# Patient Record
Sex: FEMALE | Race: WHITE | NOT HISPANIC OR LATINO | Employment: OTHER | URBAN - NONMETROPOLITAN AREA
[De-identification: names, ages, dates, MRNs, and addresses within clinical notes are randomized per-mention and may not be internally consistent; named-entity substitution may affect disease eponyms.]

---

## 2019-01-09 ENCOUNTER — LAB REQUISITION (OUTPATIENT)
Dept: LAB | Facility: HOSPITAL | Age: 74
End: 2019-01-09

## 2019-01-09 DIAGNOSIS — Z00.00 ENCOUNTER FOR GENERAL ADULT MEDICAL EXAMINATION WITHOUT ABNORMAL FINDINGS: ICD-10-CM

## 2019-01-09 LAB
FERRITIN SERPL-MCNC: 688 NG/ML (ref 17.9–464)
IRON 24H UR-MRATE: 89 MCG/DL (ref 42–180)
IRON SATN MFR SERPL: 31 % (ref 20–45)
TIBC SERPL-MCNC: 286 MCG/DL (ref 225–420)

## 2019-01-09 PROCEDURE — 82728 ASSAY OF FERRITIN: CPT | Performed by: INTERNAL MEDICINE

## 2019-01-09 PROCEDURE — 83540 ASSAY OF IRON: CPT | Performed by: INTERNAL MEDICINE

## 2019-01-09 PROCEDURE — 83550 IRON BINDING TEST: CPT | Performed by: INTERNAL MEDICINE

## 2019-01-22 ENCOUNTER — LAB REQUISITION (OUTPATIENT)
Dept: LAB | Facility: HOSPITAL | Age: 74
End: 2019-01-22

## 2019-01-22 DIAGNOSIS — Z00.00 ENCOUNTER FOR GENERAL ADULT MEDICAL EXAMINATION WITHOUT ABNORMAL FINDINGS: ICD-10-CM

## 2019-01-22 LAB
ALBUMIN SERPL-MCNC: 3.8 G/DL (ref 3.5–5)
ALBUMIN/GLOB SERPL: 1.1 G/DL (ref 1.1–2.5)
ALP SERPL-CCNC: 70 U/L (ref 24–120)
ALT SERPL W P-5'-P-CCNC: 23 U/L (ref 0–54)
ANION GAP SERPL CALCULATED.3IONS-SCNC: 11 MMOL/L (ref 4–13)
AST SERPL-CCNC: 24 U/L (ref 7–45)
BILIRUB SERPL-MCNC: 0.4 MG/DL (ref 0.1–1)
BUN BLD-MCNC: 22 MG/DL (ref 5–21)
BUN/CREAT SERPL: 17.7 (ref 7–25)
CALCIUM SPEC-SCNC: 9.5 MG/DL (ref 8.4–10.4)
CHLORIDE SERPL-SCNC: 98 MMOL/L (ref 98–110)
CO2 SERPL-SCNC: 29 MMOL/L (ref 24–31)
CREAT BLD-MCNC: 1.24 MG/DL (ref 0.5–1.4)
FERRITIN SERPL-MCNC: 541 NG/ML (ref 11.1–264)
FOLATE SERPL-MCNC: >20 NG/ML
GFR SERPL CREATININE-BSD FRML MDRD: 43 ML/MIN/1.73
GLOBULIN UR ELPH-MCNC: 3.5 GM/DL
GLUCOSE BLD-MCNC: 105 MG/DL (ref 70–100)
IRON 24H UR-MRATE: 94 MCG/DL (ref 42–180)
IRON SATN MFR SERPL: 35 % (ref 20–45)
POTASSIUM BLD-SCNC: 4.3 MMOL/L (ref 3.5–5.3)
PROT SERPL-MCNC: 7.3 G/DL (ref 6.3–8.7)
SODIUM BLD-SCNC: 138 MMOL/L (ref 135–145)
TIBC SERPL-MCNC: 269 MCG/DL (ref 225–420)
VIT B12 BLD-MCNC: >1000 PG/ML (ref 239–931)

## 2019-01-22 PROCEDURE — 82746 ASSAY OF FOLIC ACID SERUM: CPT | Performed by: INTERNAL MEDICINE

## 2019-01-22 PROCEDURE — 83550 IRON BINDING TEST: CPT | Performed by: INTERNAL MEDICINE

## 2019-01-22 PROCEDURE — 82728 ASSAY OF FERRITIN: CPT | Performed by: INTERNAL MEDICINE

## 2019-01-22 PROCEDURE — 80053 COMPREHEN METABOLIC PANEL: CPT | Performed by: INTERNAL MEDICINE

## 2019-01-22 PROCEDURE — 82607 VITAMIN B-12: CPT | Performed by: INTERNAL MEDICINE

## 2019-01-22 PROCEDURE — 83540 ASSAY OF IRON: CPT | Performed by: INTERNAL MEDICINE

## 2019-04-23 ENCOUNTER — LAB REQUISITION (OUTPATIENT)
Dept: LAB | Facility: HOSPITAL | Age: 74
End: 2019-04-23

## 2019-04-23 DIAGNOSIS — Z00.00 ENCOUNTER FOR GENERAL ADULT MEDICAL EXAMINATION WITHOUT ABNORMAL FINDINGS: ICD-10-CM

## 2019-04-23 LAB
ALBUMIN SERPL-MCNC: 3.6 G/DL (ref 3.5–5)
ALBUMIN/GLOB SERPL: 1.1 G/DL (ref 1.1–2.5)
ALP SERPL-CCNC: 70 U/L (ref 24–120)
ALT SERPL W P-5'-P-CCNC: <15 U/L (ref 0–54)
ANION GAP SERPL CALCULATED.3IONS-SCNC: 12 MMOL/L (ref 4–13)
AST SERPL-CCNC: 17 U/L (ref 7–45)
BILIRUB SERPL-MCNC: 0.4 MG/DL (ref 0.1–1)
BUN BLD-MCNC: 17 MG/DL (ref 5–21)
BUN/CREAT SERPL: 15.5 (ref 7–25)
CALCIUM SPEC-SCNC: 9.3 MG/DL (ref 8.4–10.4)
CHLORIDE SERPL-SCNC: 100 MMOL/L (ref 98–110)
CO2 SERPL-SCNC: 24 MMOL/L (ref 24–31)
CREAT BLD-MCNC: 1.1 MG/DL (ref 0.5–1.4)
FERRITIN SERPL-MCNC: 400 NG/ML (ref 11.1–264)
FOLATE SERPL-MCNC: 11.3 NG/ML (ref 4.78–24.2)
GFR SERPL CREATININE-BSD FRML MDRD: 49 ML/MIN/1.73
GLOBULIN UR ELPH-MCNC: 3.3 GM/DL
GLUCOSE BLD-MCNC: 116 MG/DL (ref 70–100)
IRON 24H UR-MRATE: 77 MCG/DL (ref 42–180)
IRON SATN MFR SERPL: 27 % (ref 20–45)
POTASSIUM BLD-SCNC: 4.5 MMOL/L (ref 3.5–5.3)
PROT SERPL-MCNC: 6.9 G/DL (ref 6.3–8.7)
SODIUM BLD-SCNC: 136 MMOL/L (ref 135–145)
TIBC SERPL-MCNC: 290 MCG/DL (ref 225–420)
VIT B12 BLD-MCNC: 492 PG/ML (ref 239–931)

## 2019-04-23 PROCEDURE — 80053 COMPREHEN METABOLIC PANEL: CPT | Performed by: INTERNAL MEDICINE

## 2019-04-23 PROCEDURE — 82728 ASSAY OF FERRITIN: CPT | Performed by: INTERNAL MEDICINE

## 2019-04-23 PROCEDURE — 82746 ASSAY OF FOLIC ACID SERUM: CPT | Performed by: INTERNAL MEDICINE

## 2019-04-23 PROCEDURE — 82607 VITAMIN B-12: CPT | Performed by: INTERNAL MEDICINE

## 2019-04-23 PROCEDURE — 83550 IRON BINDING TEST: CPT | Performed by: INTERNAL MEDICINE

## 2019-04-23 PROCEDURE — 83540 ASSAY OF IRON: CPT | Performed by: INTERNAL MEDICINE

## 2019-06-20 ENCOUNTER — TELEPHONE (OUTPATIENT)
Dept: UROLOGY | Age: 74
End: 2019-06-20

## 2019-06-20 NOTE — TELEPHONE ENCOUNTER
Attempted to call PT. She did not answer and no VM.  If she calls back, schedule her with TERRA DX: hematuria (records in media)

## 2019-07-29 ENCOUNTER — LAB REQUISITION (OUTPATIENT)
Dept: LAB | Facility: HOSPITAL | Age: 74
End: 2019-07-29

## 2019-07-29 DIAGNOSIS — Z00.00 ENCOUNTER FOR GENERAL ADULT MEDICAL EXAMINATION WITHOUT ABNORMAL FINDINGS: ICD-10-CM

## 2019-07-29 LAB
ALBUMIN SERPL-MCNC: 3.9 G/DL (ref 3.5–5)
ALBUMIN/GLOB SERPL: 1.1 G/DL (ref 1.1–2.5)
ALP SERPL-CCNC: 78 U/L (ref 24–120)
ALT SERPL W P-5'-P-CCNC: 17 U/L (ref 0–54)
ANION GAP SERPL CALCULATED.3IONS-SCNC: 11 MMOL/L (ref 4–13)
AST SERPL-CCNC: 18 U/L (ref 7–45)
BILIRUB SERPL-MCNC: 0.7 MG/DL (ref 0.1–1)
BUN BLD-MCNC: 21 MG/DL (ref 5–21)
BUN/CREAT SERPL: 19.3 (ref 7–25)
CALCIUM SPEC-SCNC: 9.6 MG/DL (ref 8.4–10.4)
CHLORIDE SERPL-SCNC: 99 MMOL/L (ref 98–110)
CO2 SERPL-SCNC: 27 MMOL/L (ref 24–31)
CREAT BLD-MCNC: 1.09 MG/DL (ref 0.5–1.4)
FERRITIN SERPL-MCNC: 433 NG/ML (ref 11.1–264)
FOLATE SERPL-MCNC: 7.5 NG/ML (ref 4.78–24.2)
GFR SERPL CREATININE-BSD FRML MDRD: 49 ML/MIN/1.73
GLOBULIN UR ELPH-MCNC: 3.4 GM/DL
GLUCOSE BLD-MCNC: 136 MG/DL (ref 70–100)
IRON 24H UR-MRATE: 96 MCG/DL (ref 42–180)
IRON SATN MFR SERPL: 33 % (ref 20–45)
POTASSIUM BLD-SCNC: 4 MMOL/L (ref 3.5–5.3)
PROT SERPL-MCNC: 7.3 G/DL (ref 6.3–8.7)
SODIUM BLD-SCNC: 137 MMOL/L (ref 135–145)
TIBC SERPL-MCNC: 290 MCG/DL (ref 225–420)
VIT B12 BLD-MCNC: 342 PG/ML (ref 239–931)

## 2019-07-29 PROCEDURE — 82607 VITAMIN B-12: CPT | Performed by: INTERNAL MEDICINE

## 2019-07-29 PROCEDURE — 82746 ASSAY OF FOLIC ACID SERUM: CPT | Performed by: INTERNAL MEDICINE

## 2019-07-29 PROCEDURE — 82728 ASSAY OF FERRITIN: CPT | Performed by: INTERNAL MEDICINE

## 2019-07-29 PROCEDURE — 83540 ASSAY OF IRON: CPT | Performed by: INTERNAL MEDICINE

## 2019-07-29 PROCEDURE — 80053 COMPREHEN METABOLIC PANEL: CPT | Performed by: INTERNAL MEDICINE

## 2019-07-29 PROCEDURE — 83550 IRON BINDING TEST: CPT | Performed by: INTERNAL MEDICINE

## 2019-08-18 PROBLEM — E61.1 IRON DEFICIENCY: Status: ACTIVE | Noted: 2019-08-18

## 2019-08-18 PROBLEM — K44.9 HIATAL HERNIA: Status: ACTIVE | Noted: 2019-08-18

## 2019-08-18 PROBLEM — I10 HYPERTENSION: Status: ACTIVE | Noted: 2019-08-18

## 2019-08-18 PROBLEM — N18.30 CKD (CHRONIC KIDNEY DISEASE), STAGE III (HCC): Status: ACTIVE | Noted: 2019-08-18

## 2019-08-18 PROBLEM — E53.8 B12 DEFICIENCY: Status: ACTIVE | Noted: 2019-08-18

## 2019-08-18 PROBLEM — K29.50 CHRONIC GASTRITIS: Status: ACTIVE | Noted: 2019-08-18

## 2019-08-18 PROBLEM — D64.9 NORMOCYTIC ANEMIA: Status: ACTIVE | Noted: 2019-08-18

## 2019-08-19 NOTE — PROGRESS NOTES
[facility] ONC [facility] ONCOLOGY  [address] [place] [address] [address]  [facility] [address]  [phone]    Patient Name: Shireen Greer  Encounter Date: 08/20/2019  YOB: 1945  Patient Number: [id number]          REASON FOR VISIT: Shireen Greer is a 73-year-old female who returns in follow-up of anemia with iron and B12 deficiencies.  She has been on ferrous sulfate since 05/2017 before it was stopped on 10/16/2017 when she received an initial infusion of INFeD 500 mg and was started on B12 monthly injections beginning 10/04/2017. It has been nearly 9 months since last receipt of IV Venofer infusion last 11/14/2018.  She is here with her daughter, Torie. History is obtained from the patient who is considered reliable.      DIAGNOSTIC ABNORMALITIES:    1.Colonoscopy, 06/24/2016.  Diverticular disease. Small rectal internal hemorrhoids. Dr. Villar.  2.EGD, 06/24/2017. Irregular Z-line at EG junction. Biopsies taken to rule out Ortiz's metaplasia. 6 mm long sliding hiatal hernia. Mild gastritis, biopsy taken. Small bowel biopsies taken to rule out celiac disease. Capsule endoscopy recommended as outpatient.  3.Labs 04/24/2017, Dr. Barron: Hemoglobin 9.9, hematocrit 31.1, MCV 81 (81 - 99), MCH 25.8 (27 - 31), MCHC 31.8 (33 - 37), RDW 14.8 (12.3 - 15.1), platelets 272,000, WBC 7.9, normal differential. CMP: Sodium 135 (depressed), glucose 132, BUN 51, creatinine 1.54 (each elevated), EGFR 35.39 (depressed), calcium 8.8, total protein 7.5, total bilirubin 0.3, AST 17, ALT 24, alkaline phos 73 (each normal). Serum iron 31 (50 - 175), TIBC 349 (250 - 450), B12 of 865, ferritin 40.  4.Labs, 06/16/2017: Hemoglobin 10. 5, hematocrit 33.5, MCV 82.4, MCH 25.9, MCHC 31.3 (each depressed), platelets 230,000, WBC 8.0, glucose 127, BUN 40, creatinine 1.4 (each elevated), GFR 39.5 (low),  (normal) iron 49, Fe sat 12.3%, ferritin 43, B12 of 236 (each depressed), folate 7.1, SIMBA negative,  "SPIEP normal. JULIAN:  No monoclonal immunoglobulins, TSH 3.5, T4 of 9.3 (each normal),  5.Stools FOB, negative x3, 06/27/2017.     PREVIOUS INTERVENTIONS:    1.Ferrous sulfate 325 mg p.o. twice daily since 05/2017 (per patient) through 10/16/2017.  2.B12, 1000 mcg monthly beginning 10/04/2017 and 11/06/2017.  3.INFeD 500 mg on 10/16/2017; 12/13/2017; 02/06/2018; 04/11/2018.  4.Venofer 200 mg IV, 11/12, 11/13, 11/14/2018 (600 mg)    ADULT ILLNESSES:    •Microcytic anemia ( ICD-10:D50.9 ;Iron deficiency anemia, unspecified  •Anemia ( ICD-10:D64.9 ;Anemia, unspecified  •Anxiety ( ICD-10:F41.9 ;Anxiety disorder, unspecified  •Arthritis (disorder) ( ICD-10:M19.90 ;Unspecified osteoarthritis, unspecified site  •Chronic kidney disease ( Stage III. GFR 35.39 mL/minute, 04/24/2017; ICD-10:N18.3 ;Chronic kidney disease, stage 3 (moderate) )  •Depression ( ICD-10:F32.9 ;Major depressive disorder, single episode, unspecified  •Diverticulosis ( history of; ICD-10:K57.90 ;Diverticulosis of intestine, part unspecified, without perforation or abscess without bleeding )  •Drug intolerance ( oral iron; ICD-10:T45.4x5D ;Adverse effect of iron and its compounds, subsequent encounter )  •Gastritis (disorder) ( ICD-10:K29.70 ;Gastritis, unspecified, without bleeding  •Hypertension ( ICD-10:I10 ;Essential (primary) hypertension  •Internal hemorrhoids ( ICD-10:K64.8 ;Other hemorrhoids  •Iron deficiency anemia (disorder) ( ICD-10:D50.9 ;Iron deficiency anemia, unspecified  •Malabsorption ( ICD-10:K90.9 ;Intestinal malabsorption, unspecified  •Urinary tract infection ( recurrent; ICD-10:N39.0 ;Urinary tract infection, site not specified )  •Vitamin B12 deficiency ( ICD-10:E53.8 ;Deficiency of other specified B group vitamins    SURGERIES:    •Bilateral cataracts, 11/2019 and 12/2019  •Hernia repair, 2011  •Partial colectomy with hernia repair  •Cystoscopy, 01/30/2018, Dr. Aguilera (urology). Recurrent urinary tract infections. \"All looked " "good\"  •Total abdominal hysterectomy with bilateral salpingo-oophorectomy, 2011  •EGD, 2016. \"Hiatal hernia\"  •EGD, 06/24/2017. Irregular Z-line at EG junction. Biopsies taken to rule out Ortiz's metaplasia. A 6 mm long sliding hiatal hernia. Mild gastritis, biopsy taken. Small bowel biopsies taken to rule out celiac disease. Capsule endoscopy recommended as outpatient  •Colonoscopy, 06/24/2016. Diverticular disease. Small rectal internal hemorrhoids. Dr. Villar      Problem List Items Addressed This Visit     None         No history exists.       PAST MEDICAL HISTORY:  ALLERGIES:  No Known Allergies  CURRENT MEDICATIONS:  Outpatient Encounter Medications as of 8/20/2019   Medication Sig Dispense Refill   • calcitriol (ROCALTROL) 0.25 MCG capsule calcitriol 0.25 mcg capsule     • Cholecalciferol (D3-1000) 1000 units tablet D3-2000 2,000 unit capsule   TAKE ONE CAPSULE BY MOUTH ONCE DAILY.     • Ergocalciferol 2000 units tablet Take 2,000 Int'l Units by mouth Daily.     • furosemide (LASIX) 20 MG tablet Take 20 mg by mouth Daily.     • losartan (COZAAR) 50 MG tablet losartan 50 mg tablet   TAKE ONE TABLET BY MOUTH EVERY DAY     • metoprolol succinate XL (TOPROL-XL) 50 MG 24 hr tablet metoprolol succinate ER 50 mg tablet,extended release 24 hr     • omeprazole (priLOSEC) 20 MG capsule      • oxybutynin (DITROPAN) 5 MG tablet Take 5 mg by mouth.     • pantoprazole (PROTONIX) 20 MG EC tablet pantoprazole 20 mg tablet,delayed release     • triamterene-hydrochlorothiazide (DYAZIDE) 37.5-25 MG per capsule triamterene 37.5 mg-hydrochlorothiazide 25 mg capsule   Take 1 capsule every day by oral route for 30 days.     • [DISCONTINUED] olmesartan (BENICAR) 40 MG tablet Take 40 mg by mouth Daily.       No facility-administered encounter medications on file as of 8/20/2019.      ADULT ILLNESSES:  Patient Active Problem List   Diagnosis Code   • Normocytic anemia D64.9   • Iron deficiency E61.1   • CKD (chronic kidney disease), " "stage III (CMS/HCC) N18.3   • B12 deficiency E53.8   • Chronic gastritis K29.50   • Hiatal hernia K44.9   • Hypertension I10     SURGERIES:  Past Surgical History:   Procedure Laterality Date   • CATARACT EXTRACTION, BILATERAL  11/2019, 12/2019   • COLECTOMY PARTIAL / TOTAL      Partial colectomy with hernia repair   • COLONOSCOPY  06/24/2016    Diverticular disease. Small rectal internal hemorrhoids. Dr. Villar   • CYSTOSCOPY  01/30/2018    Dr. Aguilera (urology). Recurrent urinary tract infections. \"All looked good\"   • ENDOSCOPY  2016    \"Hiatal hernia\"   • ENDOSCOPY  06/24/2017     Irregular Z-line at EG junction. Biopsies taken to rule out Ortiz's metaplasia. A 6 mm long sliding hiatal hernia. Mild gastritis, biopsy taken. Small bowel biopsies taken to rule out celiac disease. Capsule endoscopy recommended as outpatient   • HERNIA REPAIR  2011   • TOTAL ABDOMINAL HYSTERECTOMY WITH SALPINGO OOPHORECTOMY  2011     HEALTH MAINTENANCE ITEMS:  Health Maintenance Due   Topic Date Due   • TDAP/TD VACCINES (1 - Tdap) 09/08/1965   • ZOSTER VACCINE (1 of 2) 09/08/1996   • PNEUMOCOCCAL VACCINES (65+ LOW/MEDIUM RISK) (1 of 2 - PCV13) 09/08/2011   • HEPATITIS C SCREENING  08/12/2019   • MEDICARE ANNUAL WELLNESS  08/12/2019   • INFLUENZA VACCINE  08/01/2019       <no information>  Last Completed Colonoscopy       Status Date      COLONOSCOPY Done 6/24/2016  COLONOSCOPY  Colonoscopy           Patient has more history with this topic...          There is no immunization history on file for this patient.  Last Completed Mammogram     Patient has no health maintenance due at this time            FAMILY HISTORY:  History reviewed. No pertinent family history.  SOCIAL HISTORY:  Social History     Socioeconomic History   • Marital status: Single     Spouse name: Not on file   • Number of children: Not on file   • Years of education: Not on file   • Highest education level: Not on file   Tobacco Use   • Smoking status: Former " "Smoker   • Smokeless tobacco: Never Used       CHANGES IN PAST MEDICAL/SOCIAL/FAMILY HISTORY:   Since the last visit, Shireen Greer states that she has no changes    PRIMARY COMPLAINT:  See ROS.  \"About the same.\"    REVIEW OF SYSTEMS:  Constitutional:  The patient's appetite is fairly good. Her energy is chronically low.  \"Lately seems better.\" She manages her personal ADLs and some house chores. \"Depends, with some days better than others.\" Says she is driven around by her children.  Her son lives with her and the others live nearby.  She has lost 4 pounds (had gained 3 pounds at her prior visit).  Reminds me that she needs to lose 50 pounds before she is considered for surgery to fix an abdominal hernia.  \"Still doesn't bother me so I don't worry about it.\"  She has no fevers, chills, or drenching night sweats. Sleep habits seem appropriate.  Ear/Nose/Throat/Mouth:  She reports improving nasal congestion. No ear pains, sore throat, nosebleeds, or sore tongue. She has no headaches. She denies any hoarseness, change in voice quality, or hemoptysis.    Ocular:  She reports no eye pain, significant change in visual acuity, double vision, or blurry vision.  Improved since cataract surgeries  Respiratory:  She reports no chronic cough, significant shortness of breathing, phlegm production, or unexplained chest wall pain.  Cardiovascular:  She reports no exertional chest pain, chest pressure, or chest heaviness. She reports no claudication. She reports no palpitations or symptomatic orthostasis.  Gastrointestinal:  She reports no pica, no dysphagia, nausea, vomiting, postprandial abdominal pain, bloating, cramping, or change in bowel habits. No dark (off oral iron) discoloration of the stool. She reports no rectal bleeding, constipation, or diarrhea.  Last EGD and colonoscopy in 2016. \"Hiatal hernia.\" Oral iron intolerant - constipation.  Genitourinary:  She reports recurrent UTIs, saying she has not needed antibiotics in " "the interval.  She currently has no urinary burning, frequency, dribbling, or discoloration. She reports difficulty controlling her bladder (cough/sneeze incontinence). Wears adult pads.  She has no need to urinate frequently through the night.  Was seen by Dr. Williamson (urology) for cystoscopy, 01/30/2018.  \"All looked good.\"  Musculoskeletal:  She reports no unexplained arthralgias, myalgias, or nighttime leg cramping.  Extremities:  She reports no trouble with fluid retention or significant leg swelling.  Endocrine:  She reports no problems with excess thirst, excessive urination, vasomotor instability, or unexplained fatigue.  Heme/Lymphatic:  She reports no unexplained bleeding, bruising, petechial rashes, or swollen glands.  Skin:  She reports no itching, rashes, or lesions which won't heal.  Neuro:  She uses a walker for support due to gait unsteadiness but reports no loss of consciousness, seizures, fainting spells, or dizziness. She reports no weakness of face, arms, or legs. She has no difficulty with speech. She has no tremors.  She has no paresthesias.  Psych:  She seems generally satisfied with life. She denies depression or anxiety at this time, \"I'm okay.\" She reports no mood swings.      VITAL SIGNS: Pulse 72   Temp 97.1 °F (36.2 °C)   Resp 16   Ht 162.6 cm (64\")   Wt 116 kg (256 lb)   SpO2 94%   Breastfeeding? No   BMI 43.94 kg/m²  Body surface area is 2.17 meters squared.   Pain Score    08/20/19 1047   PainSc: 0-No pain     PHYSICAL EXAMINATION:  General:  She is a pleasant, obese, overly well-developed, well-nourished, and modestly-kept elderly female who is comfortable at rest. She arrived in the exam room ambulatory with a walker. She appears to be her stated age. Her skin color is a bit pale.    Head/Neck:  The patient is anicteric and atraumatic. The oropharynx, mouth, and throat are clear. The trachea is midline. The neck is supple without evidence of jugular venous distention or " cervical adenopathy.   Eyes:  The pupils are equal, round, and reactive to light. The extraocular movements are full. There is no scleral jaundice or erythema.    Chest:  The respiratory efforts are normal and unhindered. The chest is clear to auscultation and percussion. There are no wheezes, rhonchi, rales, or asymmetry of breath sounds.  Cardiovascular:  The patient has a regular cardiac rate and rhythm without murmurs, rubs, or gallops. The peripheral pulses are equal and full.  Abdomen:  The belly is soft and morbidly obese with a firm, nontender ventral hernia. Her habitus precludes an adequate exam. There is no rebound or guarding. There is no overt organomegaly, mass-effect, or tenderness. Bowel sounds are active and of normal character.  Extremities:  There is no evidence of cyanosis, clubbing, with 1+ bipedal edema.  Rheumatologic:  There is no overt evidence of rheumatoid deformities of the hands. There is no sausaging of the fingers. There is no sign of active synovitis. The gait is slow and walker supported.  Cutaneous:  There are no overt rashes, disseminated lesions, purpura, or petechiae.    Lymphatics:  There is no evidence of adenopathy in the cervical, supraclavicular, or axillary areas.  Neurologic:  The patient is alert, oriented, cooperative, and pleasant. She is appropriately conversant. She ambulated into the exam room without assistance but declined transfer from chair to exam table. There is no overt dysfunction of the motor, sensory, cerebellar systems.  Psych:  Mood and affect are appropriate for circumstance. Eye contact is appropriate. Normal judgement and decision making.      LABS        Lab Results - Last 18 Months   Lab Units 07/29/19  1700 04/23/19  1700 01/22/19  1600   GLUCOSE mg/dL 136* 116* 105*   SODIUM mmol/L 137 136 138   POTASSIUM mmol/L 4.0 4.5 4.3   CO2 mmol/L 27.0 24.0 29.0   CHLORIDE mmol/L 99 100 98   ANION GAP mmol/L 11.0 12.0 11.0   CREATININE mg/dL 1.09 1.10 1.24  "  BUN mg/dL 21 17 22*   BUN / CREAT RATIO  19.3 15.5 17.7   CALCIUM mg/dL 9.6 9.3 9.5   EGFR IF NONAFRICN AM mL/min/1.73 49* 49* 43*   ALK PHOS U/L 78 70 70   TOTAL PROTEIN g/dL 7.3 6.9 7.3   ALT (SGPT) U/L 17 <15 23   AST (SGOT) U/L 18 17 24   BILIRUBIN mg/dL 0.7 0.4 0.4   ALBUMIN g/dL 3.90 3.60 3.80   GLOBULIN gm/dL 3.4 3.3 3.5       No results for input(s): MSPIKE, KAPPALAMB, IGLFLC, URICACID, FREEKAPPAL, CEA, LDH, REFLABREPO in the last 95940 hours.    Lab Results - Last 18 Months   Lab Units 07/29/19  1700 04/23/19  1700 01/22/19  1600 01/09/19  1600   IRON mcg/dL 96 77 94 89   TIBC mcg/dL 290 290 269 286   IRON SATURATION % 33 27 35 31   FERRITIN ng/mL 433.00* 400.00* 541.00* 688.00*   FOLATE ng/mL 7.50 11.30 >20.00  --                ASSESSMENT:  1.    Normocytic (borderline microcytic) anemia.  Stable, Hgb 11.1; MCV 90.2 on 07/29/2019 (prior range:  Hgb 9.7 - 12.5; MCV 81 - 96.6).  Contributions from iron deficiency, iron underutilization (anemia of chronic disease), and chronic kidney disease.    2.    Iron deficiency.  Resolved since INFeD (Fe sat greater than 20%).  3.    History of chronic gastritis and hiatal hernia.  4.    Chronic kidney disease, Stage III. Baseline GFR 35.39 on 04/24/2017.  Stable, GFR 49 mL/min, 07/29/2019 (prior range:  31.6 - 52 mL/min).  5.    History of diverticulosis.  6.    Internal hemorrhoids.  7.    Arthritis.  8.    Hypertension.  9.    Oral iron failure and intolerance (constipation).  10.  Low B12.  On monthly injections.  11.  Recurrent urinary tract infections (UTIs).  None in the interval. Was seen by Dr. Williamson (urology) for cystoscopy, 01/30/2018.  \"All looked good.\"        RECOMMENDATIONS:  1.     Re:  Apprise of labs from 07/29/2019 with stable (but persistent) anemia otherwise normal CBC, repleted B12, repleted ferritin, repleted folate (Folbic called in on 11/02/2018 - stopped, 02/05/2019), repleted serum iron, repleted (greater than 20%) Fe sat " (contribution from anemia of iron underutilization/anemia of chronic disease), normoglycemia, low (stable) GFR with otherwise essentially normal CMP.  2.    Stay off Folbic  3.    Previously noted the labs from 06/16/2017 including the normal SPIEP, low GFR but otherwise stable CMP, negative SIMBA, low B12, normal folate, low serum Fe, normal TIBC, low ferritin, low transferrin saturation, normal LDH/T4 and TSH.  Also noted the negative stools for fecal occult blood (FOB) x3.  4.    Previously reviewed the EGD and colonoscopy reports from 06/2016.  5.    The rationale and potential toxicities (including the risk for increased mortality from stroke, myocardial infarction (MI), congestive heart failure (CHF), seizures, sepsis, allergic reactions) for ULISES support previously discussed. Questions answered.  She will agree to proceed with a trial of therapy if and when it becomes indicated.   6.    The rationale and potential toxicities (anaphylaxis included) of IV iron previously discussed. Questions answered. She will agree to a trial of therapy if and when it is deemed indicated.   7.    CBC w diff every 4 weeks and Procrit 40,000 units subcutaneously if Hgb less than 10 and hematocrit less than 30 initially, then less than 11 and less than 33 [place] [facility] [place]  8.    Advance Directive discussed and available for review - initially discussed on 02/05/2019.  9.  Return to the [place] office in 12 weeks with pre-office (fasting) CMP, B12, folate, serum iron, Fe sat, ferritin, CBC with differential.    MEDICAL DECISION MAKING: Moderate Complexity  AMOUNT OF DATA: Moderate    TIME SPENT:  Face-to-face time on this encounter, as defined by the American Medical Association in the 2019 Current Procedural Terminology codebook; assessment, record review, lab review, planning and education - 30 minutes (greater than 50% face-to-face).    Vignesh Holman MD  [facility]    cc:  MD Nadja Burgos APRN

## 2019-08-20 ENCOUNTER — OFFICE VISIT (OUTPATIENT)
Dept: ONCOLOGY | Facility: CLINIC | Age: 74
End: 2019-08-20

## 2019-08-20 VITALS
OXYGEN SATURATION: 94 % | RESPIRATION RATE: 16 BRPM | HEART RATE: 72 BPM | TEMPERATURE: 97.1 F | WEIGHT: 256 LBS | HEIGHT: 64 IN | BODY MASS INDEX: 43.71 KG/M2

## 2019-08-20 DIAGNOSIS — C91.11 CHRONIC LYMPHOCYTIC LEUKEMIA OF B-CELL TYPE IN REMISSION (HCC): Primary | ICD-10-CM

## 2019-08-20 PROCEDURE — 99214 OFFICE O/P EST MOD 30 MIN: CPT | Performed by: INTERNAL MEDICINE

## 2019-08-20 RX ORDER — OMEPRAZOLE 20 MG/1
CAPSULE, DELAYED RELEASE ORAL
COMMUNITY
Start: 2016-08-29 | End: 2020-01-21

## 2019-08-20 RX ORDER — FUROSEMIDE 20 MG/1
20 TABLET ORAL DAILY
COMMUNITY
Start: 2016-09-15

## 2019-08-20 RX ORDER — LOSARTAN POTASSIUM 50 MG/1
TABLET ORAL
COMMUNITY
End: 2020-12-07 | Stop reason: ALTCHOICE

## 2019-08-20 RX ORDER — OLMESARTAN MEDOXOMIL 40 MG/1
40 TABLET ORAL DAILY
COMMUNITY
Start: 2016-09-15 | End: 2019-08-20

## 2019-08-20 RX ORDER — PANTOPRAZOLE SODIUM 20 MG/1
TABLET, DELAYED RELEASE ORAL
COMMUNITY
End: 2020-01-21 | Stop reason: DRUGHIGH

## 2019-08-20 RX ORDER — CALCITRIOL 0.25 UG/1
0.25 CAPSULE, LIQUID FILLED ORAL DAILY
COMMUNITY

## 2019-08-20 RX ORDER — TRIAMTERENE AND HYDROCHLOROTHIAZIDE 37.5; 25 MG/1; MG/1
CAPSULE ORAL
COMMUNITY
End: 2020-12-07 | Stop reason: ALTCHOICE

## 2019-08-20 RX ORDER — METOPROLOL SUCCINATE 50 MG/1
TABLET, EXTENDED RELEASE ORAL
Status: ON HOLD | COMMUNITY
End: 2023-01-21

## 2019-08-20 RX ORDER — OXYBUTYNIN CHLORIDE 5 MG/1
5 TABLET ORAL
COMMUNITY
Start: 2016-08-29 | End: 2020-01-21 | Stop reason: DRUGHIGH

## 2019-09-18 DIAGNOSIS — C91.11 CHRONIC LYMPHOCYTIC LEUKEMIA OF B-CELL TYPE IN REMISSION (HCC): Primary | ICD-10-CM

## 2019-11-04 ENCOUNTER — LAB (OUTPATIENT)
Dept: ONCOLOGY | Facility: CLINIC | Age: 74
End: 2019-11-04

## 2019-11-04 ENCOUNTER — CLINICAL SUPPORT (OUTPATIENT)
Dept: ONCOLOGY | Facility: CLINIC | Age: 74
End: 2019-11-04

## 2019-11-04 VITALS
HEART RATE: 72 BPM | DIASTOLIC BLOOD PRESSURE: 78 MMHG | OXYGEN SATURATION: 97 % | RESPIRATION RATE: 18 BRPM | SYSTOLIC BLOOD PRESSURE: 118 MMHG | TEMPERATURE: 97.8 F

## 2019-11-04 DIAGNOSIS — C91.11 CHRONIC LYMPHOCYTIC LEUKEMIA OF B-CELL TYPE IN REMISSION (HCC): ICD-10-CM

## 2019-11-04 DIAGNOSIS — N18.30 CKD (CHRONIC KIDNEY DISEASE), STAGE III (HCC): ICD-10-CM

## 2019-11-04 DIAGNOSIS — N18.30 ANEMIA IN STAGE 3 CHRONIC KIDNEY DISEASE (HCC): ICD-10-CM

## 2019-11-04 DIAGNOSIS — D63.1 ANEMIA IN STAGE 3 CHRONIC KIDNEY DISEASE (HCC): ICD-10-CM

## 2019-11-04 LAB
ALBUMIN SERPL-MCNC: 3.8 G/DL (ref 3.5–5.2)
ALBUMIN/GLOB SERPL: 1 G/DL
ALP SERPL-CCNC: 69 U/L (ref 39–117)
ALT SERPL W P-5'-P-CCNC: 13 U/L (ref 1–33)
ANION GAP SERPL CALCULATED.3IONS-SCNC: 16 MMOL/L (ref 5–15)
AST SERPL-CCNC: 18 U/L (ref 1–32)
BASOPHILS # BLD AUTO: 0.01 10*3/MM3 (ref 0–0.2)
BASOPHILS NFR BLD AUTO: 0.1 % (ref 0–1.5)
BILIRUB SERPL-MCNC: 0.5 MG/DL (ref 0.2–1.2)
BUN BLD-MCNC: 19 MG/DL (ref 8–23)
BUN/CREAT SERPL: 14.2 (ref 7–25)
CALCIUM SPEC-SCNC: 9.5 MG/DL (ref 8.6–10.5)
CHLORIDE SERPL-SCNC: 96 MMOL/L (ref 98–107)
CO2 SERPL-SCNC: 25 MMOL/L (ref 22–29)
CREAT BLD-MCNC: 1.34 MG/DL (ref 0.57–1)
DEPRECATED RDW RBC AUTO: 42.6 FL (ref 37–54)
EOSINOPHIL # BLD AUTO: 0.32 10*3/MM3 (ref 0–0.4)
EOSINOPHIL NFR BLD AUTO: 3.3 % (ref 0.3–6.2)
ERYTHROCYTE [DISTWIDTH] IN BLOOD BY AUTOMATED COUNT: 12.9 % (ref 12.3–15.4)
FERRITIN SERPL-MCNC: 536.6 NG/ML (ref 13–150)
GFR SERPL CREATININE-BSD FRML MDRD: 39 ML/MIN/1.73
GLOBULIN UR ELPH-MCNC: 4 GM/DL
GLUCOSE BLD-MCNC: 137 MG/DL (ref 65–99)
HCT VFR BLD AUTO: 35 % (ref 34–46.6)
HGB BLD-MCNC: 11.1 G/DL (ref 12–15.9)
IMM GRANULOCYTES # BLD AUTO: 0.02 10*3/MM3 (ref 0–0.05)
IMM GRANULOCYTES NFR BLD AUTO: 0.2 % (ref 0–0.5)
IRON 24H UR-MRATE: 56 MCG/DL (ref 37–145)
IRON SATN MFR SERPL: 15 % (ref 20–50)
LYMPHOCYTES # BLD AUTO: 2.37 10*3/MM3 (ref 0.7–3.1)
LYMPHOCYTES NFR BLD AUTO: 24.3 % (ref 19.6–45.3)
MCH RBC QN AUTO: 28.4 PG (ref 26.6–33)
MCHC RBC AUTO-ENTMCNC: 31.7 G/DL (ref 31.5–35.7)
MCV RBC AUTO: 89.5 FL (ref 79–97)
MONOCYTES # BLD AUTO: 0.79 10*3/MM3 (ref 0.1–0.9)
MONOCYTES NFR BLD AUTO: 8.1 % (ref 5–12)
NEUTROPHILS # BLD AUTO: 6.25 10*3/MM3 (ref 1.7–7)
NEUTROPHILS NFR BLD AUTO: 64 % (ref 42.7–76)
PLATELET # BLD AUTO: 224 10*3/MM3 (ref 140–450)
PMV BLD AUTO: 10.9 FL (ref 6–12)
POTASSIUM BLD-SCNC: 4 MMOL/L (ref 3.5–5.2)
PROT SERPL-MCNC: 7.8 G/DL (ref 6–8.5)
RBC # BLD AUTO: 3.91 10*6/MM3 (ref 3.77–5.28)
SODIUM BLD-SCNC: 137 MMOL/L (ref 136–145)
TIBC SERPL-MCNC: 373 MCG/DL (ref 298–536)
TRANSFERRIN SERPL-MCNC: 250 MG/DL (ref 200–360)
WBC NRBC COR # BLD: 9.76 10*3/MM3 (ref 3.4–10.8)

## 2019-11-04 PROCEDURE — 84466 ASSAY OF TRANSFERRIN: CPT | Performed by: INTERNAL MEDICINE

## 2019-11-04 PROCEDURE — 82746 ASSAY OF FOLIC ACID SERUM: CPT | Performed by: INTERNAL MEDICINE

## 2019-11-04 PROCEDURE — 99211 OFF/OP EST MAY X REQ PHY/QHP: CPT | Performed by: INTERNAL MEDICINE

## 2019-11-04 PROCEDURE — 80053 COMPREHEN METABOLIC PANEL: CPT | Performed by: INTERNAL MEDICINE

## 2019-11-04 PROCEDURE — 83540 ASSAY OF IRON: CPT | Performed by: INTERNAL MEDICINE

## 2019-11-04 PROCEDURE — 85025 COMPLETE CBC W/AUTO DIFF WBC: CPT | Performed by: INTERNAL MEDICINE

## 2019-11-04 PROCEDURE — 82607 VITAMIN B-12: CPT | Performed by: INTERNAL MEDICINE

## 2019-11-04 PROCEDURE — 82728 ASSAY OF FERRITIN: CPT | Performed by: INTERNAL MEDICINE

## 2019-11-04 NOTE — PROGRESS NOTES
Patient here for lab evaluation for a Procrit injection secondary to stage III chronic kidney disease.  States that she is feeling good today.  Gets a little tired with activity but states that this is nothing new.  Skin w/d to touch.  Color wnl.  Eating and drinking well. Parameters set to start Procrit for Hgb <10 and Hct <30. Patient has never required a Procrit injection in the past.  Has had to have several doses of IV iron with saturation stabilizing and Hgb and Hct stating >11 and >33. Reviewed labs with patient and daughter-Hgb 11.1 and Hct 35.  Patient has an appointment next week with MILLICENT Corley and she and her daughter wants to discuss changing frequency of lab work so that she does not have to come as frequently.  Instructed to call with any problems.  Patient v/u.

## 2019-11-05 LAB
FOLATE SERPL-MCNC: 12.4 NG/ML (ref 4.78–24.2)
VIT B12 BLD-MCNC: 440 PG/ML (ref 211–946)

## 2019-11-07 ENCOUNTER — TELEPHONE (OUTPATIENT)
Dept: ONCOLOGY | Facility: CLINIC | Age: 74
End: 2019-11-07

## 2019-11-07 PROBLEM — K90.9 IRON MALABSORPTION: Status: ACTIVE | Noted: 2019-11-07

## 2019-11-07 RX ORDER — ACETAMINOPHEN 325 MG/1
650 TABLET ORAL ONCE
Status: CANCELLED | OUTPATIENT
Start: 2019-12-02

## 2019-11-07 RX ORDER — SODIUM CHLORIDE 9 MG/ML
250 INJECTION, SOLUTION INTRAVENOUS ONCE
Status: CANCELLED | OUTPATIENT
Start: 2019-12-09

## 2019-11-07 RX ORDER — DIPHENHYDRAMINE HYDROCHLORIDE 50 MG/ML
50 INJECTION INTRAMUSCULAR; INTRAVENOUS AS NEEDED
Status: CANCELLED | OUTPATIENT
Start: 2019-12-02

## 2019-11-07 RX ORDER — DIPHENHYDRAMINE HYDROCHLORIDE 50 MG/ML
50 INJECTION INTRAMUSCULAR; INTRAVENOUS AS NEEDED
Status: CANCELLED | OUTPATIENT
Start: 2019-12-09

## 2019-11-07 RX ORDER — FAMOTIDINE 10 MG/ML
20 INJECTION, SOLUTION INTRAVENOUS AS NEEDED
Status: CANCELLED | OUTPATIENT
Start: 2019-12-09

## 2019-11-07 RX ORDER — SODIUM CHLORIDE 9 MG/ML
250 INJECTION, SOLUTION INTRAVENOUS ONCE
Status: CANCELLED | OUTPATIENT
Start: 2019-12-02

## 2019-11-07 RX ORDER — ACETAMINOPHEN 325 MG/1
650 TABLET ORAL ONCE
Status: CANCELLED | OUTPATIENT
Start: 2019-12-09

## 2019-11-07 RX ORDER — FAMOTIDINE 10 MG/ML
20 INJECTION, SOLUTION INTRAVENOUS AS NEEDED
Status: CANCELLED | OUTPATIENT
Start: 2019-12-02

## 2019-11-07 NOTE — TELEPHONE ENCOUNTER
Called and instructed patient on low iron saturation and that she is needing to get IV iron.  Patient stated that she cannot tolerate oral iron.  Instructed that she will get an iron infusion once a week for two weeks.  This has been set up at [facility] [place] [facility] for 11/12 and 11/19 at 1200. Patient v/u and is agreeable to plan.

## 2019-11-25 NOTE — TELEPHONE ENCOUNTER
Patient's son passed away on11/11/19 so the pt was not able to go [place] [facility] [place] I have r/s her appt with Ali for 12/5/19. If she needs to have the iron r/s prior to that please call her.

## 2019-11-26 NOTE — TELEPHONE ENCOUNTER
Called patient and iron infusions have been rescheduled to be done on 12/02 and 12/09 at [facility] [place].  Instructed patient that she does not need to keep appointment on 12/05, that we will reschedule it and call her with new appointment date and time.  Patient v/u.

## 2019-11-27 ENCOUNTER — TELEPHONE (OUTPATIENT)
Dept: ONCOLOGY | Facility: CLINIC | Age: 74
End: 2019-11-27

## 2019-11-27 NOTE — TELEPHONE ENCOUNTER
Attempted to contact pt per Anish junior 12/5/19 appts. She needs to be seen about a month after her iron infusion on 12/19/19. Left v/m for her to call back.

## 2020-01-16 ENCOUNTER — LAB (OUTPATIENT)
Dept: ONCOLOGY | Facility: CLINIC | Age: 75
End: 2020-01-16

## 2020-01-16 ENCOUNTER — CLINICAL SUPPORT (OUTPATIENT)
Dept: ONCOLOGY | Facility: CLINIC | Age: 75
End: 2020-01-16

## 2020-01-16 VITALS
TEMPERATURE: 97.8 F | DIASTOLIC BLOOD PRESSURE: 88 MMHG | HEART RATE: 60 BPM | OXYGEN SATURATION: 96 % | SYSTOLIC BLOOD PRESSURE: 128 MMHG | RESPIRATION RATE: 16 BRPM

## 2020-01-16 DIAGNOSIS — C91.11 CHRONIC LYMPHOCYTIC LEUKEMIA OF B-CELL TYPE IN REMISSION (HCC): ICD-10-CM

## 2020-01-16 DIAGNOSIS — D63.1 ANEMIA IN STAGE 3 CHRONIC KIDNEY DISEASE (HCC): ICD-10-CM

## 2020-01-16 DIAGNOSIS — N18.30 CKD (CHRONIC KIDNEY DISEASE), STAGE III (HCC): ICD-10-CM

## 2020-01-16 DIAGNOSIS — N18.30 ANEMIA IN STAGE 3 CHRONIC KIDNEY DISEASE (HCC): ICD-10-CM

## 2020-01-16 LAB
ALBUMIN SERPL-MCNC: 3.6 G/DL (ref 3.5–5.2)
ALBUMIN/GLOB SERPL: 0.9 G/DL
ALP SERPL-CCNC: 78 U/L (ref 39–117)
ALT SERPL W P-5'-P-CCNC: 10 U/L (ref 1–33)
ANION GAP SERPL CALCULATED.3IONS-SCNC: 10 MMOL/L (ref 5–15)
AST SERPL-CCNC: 15 U/L (ref 1–32)
BASOPHILS # BLD AUTO: 0.01 10*3/MM3 (ref 0–0.2)
BASOPHILS NFR BLD AUTO: 0.1 % (ref 0–1.5)
BILIRUB SERPL-MCNC: 0.3 MG/DL (ref 0.2–1.2)
BUN BLD-MCNC: 20 MG/DL (ref 8–23)
BUN/CREAT SERPL: 16.3 (ref 7–25)
CALCIUM SPEC-SCNC: 9.5 MG/DL (ref 8.6–10.5)
CHLORIDE SERPL-SCNC: 97 MMOL/L (ref 98–107)
CO2 SERPL-SCNC: 28 MMOL/L (ref 22–29)
CREAT BLD-MCNC: 1.23 MG/DL (ref 0.57–1)
DEPRECATED RDW RBC AUTO: 44.4 FL (ref 37–54)
EOSINOPHIL # BLD AUTO: 0.18 10*3/MM3 (ref 0–0.4)
EOSINOPHIL NFR BLD AUTO: 2.2 % (ref 0.3–6.2)
ERYTHROCYTE [DISTWIDTH] IN BLOOD BY AUTOMATED COUNT: 13.2 % (ref 12.3–15.4)
FERRITIN SERPL-MCNC: 477.8 NG/ML (ref 13–150)
GFR SERPL CREATININE-BSD FRML MDRD: 43 ML/MIN/1.73
GLOBULIN UR ELPH-MCNC: 4 GM/DL
GLUCOSE BLD-MCNC: 208 MG/DL (ref 65–99)
HCT VFR BLD AUTO: 33.9 % (ref 34–46.6)
HGB BLD-MCNC: 10.5 G/DL (ref 12–15.9)
IMM GRANULOCYTES # BLD AUTO: 0.01 10*3/MM3 (ref 0–0.05)
IMM GRANULOCYTES NFR BLD AUTO: 0.1 % (ref 0–0.5)
IRON 24H UR-MRATE: 47 MCG/DL (ref 37–145)
IRON SATN MFR SERPL: 15 % (ref 20–50)
LYMPHOCYTES # BLD AUTO: 1.32 10*3/MM3 (ref 0.7–3.1)
LYMPHOCYTES NFR BLD AUTO: 16.4 % (ref 19.6–45.3)
MCH RBC QN AUTO: 27.7 PG (ref 26.6–33)
MCHC RBC AUTO-ENTMCNC: 31 G/DL (ref 31.5–35.7)
MCV RBC AUTO: 89.4 FL (ref 79–97)
MONOCYTES # BLD AUTO: 0.65 10*3/MM3 (ref 0.1–0.9)
MONOCYTES NFR BLD AUTO: 8.1 % (ref 5–12)
NEUTROPHILS # BLD AUTO: 5.87 10*3/MM3 (ref 1.7–7)
NEUTROPHILS NFR BLD AUTO: 73.1 % (ref 42.7–76)
PLATELET # BLD AUTO: 236 10*3/MM3 (ref 140–450)
PMV BLD AUTO: 10.1 FL (ref 6–12)
POTASSIUM BLD-SCNC: 3.7 MMOL/L (ref 3.5–5.2)
PROT SERPL-MCNC: 7.6 G/DL (ref 6–8.5)
RBC # BLD AUTO: 3.79 10*6/MM3 (ref 3.77–5.28)
SODIUM BLD-SCNC: 135 MMOL/L (ref 136–145)
TIBC SERPL-MCNC: 322 MCG/DL (ref 298–536)
TRANSFERRIN SERPL-MCNC: 216 MG/DL (ref 200–360)
WBC NRBC COR # BLD: 8.04 10*3/MM3 (ref 3.4–10.8)

## 2020-01-16 PROCEDURE — 80053 COMPREHEN METABOLIC PANEL: CPT | Performed by: INTERNAL MEDICINE

## 2020-01-16 PROCEDURE — 82728 ASSAY OF FERRITIN: CPT | Performed by: INTERNAL MEDICINE

## 2020-01-16 PROCEDURE — 99211 OFF/OP EST MAY X REQ PHY/QHP: CPT | Performed by: NURSE PRACTITIONER

## 2020-01-16 PROCEDURE — 85025 COMPLETE CBC W/AUTO DIFF WBC: CPT | Performed by: INTERNAL MEDICINE

## 2020-01-16 PROCEDURE — 83540 ASSAY OF IRON: CPT | Performed by: INTERNAL MEDICINE

## 2020-01-16 PROCEDURE — 84466 ASSAY OF TRANSFERRIN: CPT | Performed by: INTERNAL MEDICINE

## 2020-01-16 NOTE — PROGRESS NOTES
Patient here for lab evaluation to initiate Retacrit for stage III chronic kidney disease.  States that she is feeling ok today.  Gets tired with activity.  Skin w/d to touch.  Color wnl. Eating and drinking well.  Parameters set for Retacrit 40,000 units for Hgb <10 and Hct <30.  Reviewed labs with patient and daughter, Hgb 10.5 and Hct 33.9-patient does not meet guidelines to initiate injections.  Patient has an appointment with Dr Parkinson 01/21/2020-will discuss with him about appointment frequency.  Daughter would like it to be change to every 6 or 8 weeks.  Instructed to call with any problems.  Patient and daughter v/u.

## 2020-01-18 NOTE — PROGRESS NOTES
[facility] ONC [facility] ONCOLOGY  [address] [place] [address] [address]  [facility] [address]  [phone]    Patient Name: Shireen Greer  Encounter Date: 01/21/2020  YOB: 1945  Patient Number: [id number]      REASON FOR VISIT: Shireen Greer is a 74-year-old female who returns in follow-up of anemia with iron and B12 deficiencies.  She has been on ferrous sulfate since 05/2017 before it was stopped on 10/16/2017 when she received an initial infusion of INFeD 500 mg and was started on B12 monthly injections beginning 10/04/2017. It has been over 14 months since last receipt of IV Venofer infusion last 11/14/2018.  She is here with her daughter, Torie. History is obtained from the patient who is considered reliable.      DIAGNOSTIC ABNORMALITIES:  1.Colonoscopy, 06/24/2016.  Diverticular disease. Small rectal internal hemorrhoids. Dr. Villar.  2.EGD, 06/24/2017. Irregular Z-line at EG junction. Biopsies taken to rule out Ortiz's metaplasia. 6 mm long sliding hiatal hernia. Mild gastritis, biopsy taken. Small bowel biopsies taken to rule out celiac disease. Capsule endoscopy recommended as outpatient.  3.Labs 04/24/2017, Dr. Barron: Hemoglobin 9.9, hematocrit 31.1, MCV 81 (81 - 99), MCH 25.8 (27 - 31), MCHC 31.8 (33 - 37), RDW 14.8 (12.3 - 15.1), platelets 272,000, WBC 7.9, normal differential. CMP: Sodium 135 (depressed), glucose 132, BUN 51, creatinine 1.54 (each elevated), EGFR 35.39 (depressed), calcium 8.8, total protein 7.5, total bilirubin 0.3, AST 17, ALT 24, alkaline phos 73 (each normal). Serum iron 31 (50 - 175), TIBC 349 (250 - 450), B12 of 865, ferritin 40.  4.Labs, 06/16/2017: Hemoglobin 10. 5, hematocrit 33.5, MCV 82.4, MCH 25.9, MCHC 31.3 (each depressed), platelets 230,000, WBC 8.0, glucose 127, BUN 40, creatinine 1.4 (each elevated), GFR 39.5 (low),  (normal) iron 49, Fe sat 12.3%, ferritin 43, B12 of 236 (each depressed), folate 7.1, SIMBA negative, SPIEP  "normal. JULIAN:  No monoclonal immunoglobulins, TSH 3.5, T4 of 9.3 (each normal),  5.Stools FOB, negative x3, 06/27/2017.     PREVIOUS INTERVENTIONS:    1.Ferrous sulfate 325 mg p.o. twice daily since 05/2017 (per patient) through 10/16/2017.  2.B12, 1000 mcg monthly beginning 10/04/2017 and 11/06/2017.  3.INFeD 500 mg on 10/16/2017; 12/13/2017; 02/06/2018; 04/11/2018.  4.Venofer 200 mg IV, 11/12, 11/13, 11/14/2018 (600 mg)    ADULT ILLNESSES:    •Microcytic anemia ( ICD-10:D50.9 ;Iron deficiency anemia, unspecified  •Anemia ( ICD-10:D64.9 ;Anemia, unspecified  •Anxiety ( ICD-10:F41.9 ;Anxiety disorder, unspecified  •Arthritis (disorder) ( ICD-10:M19.90 ;Unspecified osteoarthritis, unspecified site  •Chronic kidney disease ( Stage III. GFR 35.39 mL/minute, 04/24/2017; ICD-10:N18.3 ;Chronic kidney disease, stage 3 (moderate) )  •Depression ( ICD-10:F32.9 ;Major depressive disorder, single episode, unspecified  •Diverticulosis ( history of; ICD-10:K57.90 ;Diverticulosis of intestine, part unspecified, without perforation or abscess without bleeding )  •Drug intolerance ( oral iron; ICD-10:T45.4x5D ;Adverse effect of iron and its compounds, subsequent encounter )  •Gastritis (disorder) ( ICD-10:K29.70 ;Gastritis, unspecified, without bleeding  •Hypertension ( ICD-10:I10 ;Essential (primary) hypertension  •Internal hemorrhoids ( ICD-10:K64.8 ;Other hemorrhoids  •Iron deficiency anemia (disorder) ( ICD-10:D50.9 ;Iron deficiency anemia, unspecified  •Malabsorption ( ICD-10:K90.9 ;Intestinal malabsorption, unspecified  •Urinary tract infection ( recurrent; ICD-10:N39.0 ;Urinary tract infection, site not specified )  •Vitamin B12 deficiency ( ICD-10:E53.8 ;Deficiency of other specified B group vitamins    SURGERIES:    •Bilateral cataracts, 11/2019 and 12/2019  •Hernia repair, 2011  •Partial colectomy with hernia repair  •Cystoscopy, 01/30/2018, Dr. Aguilera (urology). Recurrent urinary tract infections. \"All looked " "good\"  •Total abdominal hysterectomy with bilateral salpingo-oophorectomy, 2011  •EGD, 2016. \"Hiatal hernia\"  •EGD, 06/24/2017. Irregular Z-line at EG junction. Biopsies taken to rule out Ortiz's metaplasia. A 6 mm long sliding hiatal hernia. Mild gastritis, biopsy taken. Small bowel biopsies taken to rule out celiac disease. Capsule endoscopy recommended as outpatient  •Colonoscopy, 06/24/2016. Diverticular disease. Small rectal internal hemorrhoids. Dr. Villar      Problem List Items Addressed This Visit        Genitourinary    CKD (chronic kidney disease), stage III (CMS/HCC) - Primary    Anemia in stage 3 chronic kidney disease (CMS/HCC)       Hematopoietic and Hemostatic    Normocytic anemia    Overview     DIAGNOSTIC ABNORMALITIES:  Colonoscopy, 06/24/2016.  Diverticular disease. Small rectal internal hemorrhoids. Dr. Villar.  EGD, 06/24/2017. Irregular Z-line at EG junction. Biopsies taken to rule out Ortiz's metaplasia. 6 mm long sliding hiatal hernia. Mild gastritis, biopsy taken. Small bowel biopsies taken to rule out celiac disease. Capsule endoscopy recommended as outpatient.  Labs 04/24/2017, Dr. Barron: Hemoglobin 9.9, hematocrit 31.1, MCV 81 (81 - 99), MCH 25.8 (27 - 31), MCHC 31.8 (33 - 37), RDW 14.8 (12.3 - 15.1), platelets 272,000, WBC 7.9, normal differential. CMP: Sodium 135 (depressed), glucose 132, BUN 51, creatinine 1.54 (each elevated), EGFR 35.39 (depressed), calcium 8.8, total protein 7.5, total bilirubin 0.3, AST 17, ALT 24, alkaline phos 73 (each normal). Serum iron 31 (50 - 175), TIBC 349 (250 - 450), B12 of 865, ferritin 40.\[id number], 06/16/2017: Hemoglobin 10. 5, hematocrit 33.5, MCV 82.4, MCH 25.9, MCHC 31.3 (each depressed), platelets 230,000, WBC 8.0, glucose 127, BUN 40, creatinine 1.4 (each elevated), GFR 39.5 (low),  (normal) iron 49, Fe sat 12.3%, ferritin 43, B12 of 236 (each depressed), folate 7.1, SIMBA negative, SPIEP normal. JULIAN:  No monoclonal immunoglobulins, TSH " 3.5, T4 of 9.3 (each normal),Stools FOB, negative x3, 06/27/2017.    PREVIOUS INTERVENTIONS:  Ferrous sulfate 325 mg p.o. twice daily since 05/2017 (per patient) through 10/16/2017.    B12, 1000 mcg monthly beginning 10/04/2017 and 11/06/2017.    INFeD 500 mg on 10/16/2017; 12/13/2017; 02/06/2018; 04/11/2018.    Venofer 200 mg IV, 11/12, 11/13, 11/14/2018 (600 mg)              No history exists.       PAST MEDICAL HISTORY:  ALLERGIES:  No Known Allergies  CURRENT MEDICATIONS:  Outpatient Encounter Medications as of 1/21/2020   Medication Sig Dispense Refill   • calcitriol (ROCALTROL) 0.25 MCG capsule calcitriol 0.25 mcg capsule     • Cholecalciferol (D3-1000) 1000 units tablet D3-2000 2,000 unit capsule   TAKE ONE CAPSULE BY MOUTH ONCE DAILY.     • Ergocalciferol 2000 units tablet Take 2,000 Int'l Units by mouth Daily.     • furosemide (LASIX) 20 MG tablet Take 20 mg by mouth Daily.     • losartan (COZAAR) 50 MG tablet losartan 50 mg tablet   TAKE ONE TABLET BY MOUTH EVERY DAY     • metoprolol succinate XL (TOPROL-XL) 50 MG 24 hr tablet metoprolol succinate ER 50 mg tablet,extended release 24 hr     • oxybutynin XL (DITROPAN-XL) 10 MG 24 hr tablet      • pantoprazole (PROTONIX) 40 MG EC tablet      • triamterene-hydrochlorothiazide (DYAZIDE) 37.5-25 MG per capsule triamterene 37.5 mg-hydrochlorothiazide 25 mg capsule   Take 1 capsule every day by oral route for 30 days.     • [DISCONTINUED] omeprazole (priLOSEC) 20 MG capsule      • [DISCONTINUED] oxybutynin (DITROPAN) 5 MG tablet Take 5 mg by mouth.     • [DISCONTINUED] pantoprazole (PROTONIX) 20 MG EC tablet pantoprazole 20 mg tablet,delayed release       No facility-administered encounter medications on file as of 1/21/2020.      ADULT ILLNESSES:  Patient Active Problem List   Diagnosis Code   • Normocytic anemia D64.9   • Iron deficiency E61.1   • CKD (chronic kidney disease), stage III (CMS/HCC) N18.3   • B12 deficiency E53.8   • Chronic gastritis K29.50   •  "Hiatal hernia K44.9   • Hypertension I10   • Anemia in stage 3 chronic kidney disease (CMS/[facility]) N18.3, D63.1   • Iron malabsorption K90.9   • Adiposity E66.9   • Calculus of gallbladder K80.20   • Chronic obstructive pulmonary disease (CMS/[facility]) J44.9   • Diabetes mellitus (CMS/[facility]) E11.9   • Gastroesophageal reflux disease K21.9   • Incisional hernia K43.2   • Left inguinal hernia K40.90     SURGERIES:  Past Surgical History:   Procedure Laterality Date   • CATARACT EXTRACTION, BILATERAL  11/2019, 12/2019   • COLECTOMY PARTIAL / TOTAL      Partial colectomy with hernia repair   • COLONOSCOPY  06/24/2016    Diverticular disease. Small rectal internal hemorrhoids. Dr. Villar   • CYSTOSCOPY  01/30/2018    Dr. Aguilera (urology). Recurrent urinary tract infections. \"All looked good\"   • ENDOSCOPY  2016    \"Hiatal hernia\"   • ENDOSCOPY  06/24/2017     Irregular Z-line at EG junction. Biopsies taken to rule out Ortiz's metaplasia. A 6 mm long sliding hiatal hernia. Mild gastritis, biopsy taken. Small bowel biopsies taken to rule out celiac disease. Capsule endoscopy recommended as outpatient   • HERNIA REPAIR  2011   • TOTAL ABDOMINAL HYSTERECTOMY WITH SALPINGO OOPHORECTOMY  2011     HEALTH MAINTENANCE ITEMS:  Health Maintenance Due   Topic Date Due   • MAMMOGRAM  1945   • URINE MICROALBUMIN  1945   • TDAP/TD VACCINES (1 - Tdap) 09/08/1957   • ZOSTER VACCINE (1 of 2) 09/08/1996   • PNEUMOCOCCAL VACCINE (65+ HIGH RISK) (2 of 2 - PPSV23) 11/29/2017   • INFLUENZA VACCINE  08/01/2019   • HEPATITIS C SCREENING  08/12/2019   • MEDICARE ANNUAL WELLNESS  08/12/2019   • DIABETIC FOOT EXAM  08/12/2019   • HEMOGLOBIN A1C  08/12/2019   • DIABETIC EYE EXAM  08/12/2019       <no information>  Last Completed Colonoscopy       Status Date      COLONOSCOPY Done 6/24/2016 HM COLONOSCOPY HM Colonoscopy           Patient has more history with this topic...          There is no immunization history on file for this patient.  Last " "Completed Mammogram       Status Date      MAMMOGRAM No completions recorded            FAMILY HISTORY:  History reviewed. No pertinent family history.  SOCIAL HISTORY:  Social History     Socioeconomic History   • Marital status: Single     Spouse name: Not on file   • Number of children: Not on file   • Years of education: Not on file   • Highest education level: Not on file   Tobacco Use   • Smoking status: Former Smoker   • Smokeless tobacco: Never Used       REVIEW OF SYSTEMS:  Constitutional:  The patient's appetite is fairly good. Her energy is chronically low.  \"Some days better than others.\" She manages her personal ADLs and some house chores. Says she is driven around by her children.  Her son lives with her and the others live nearby.  She has regained 3 pounds (had lost 4 pounds at her prior visit).  Reminds me that she needs to lose 50 pounds before she is considered for surgery to fix an abdominal hernia.  Asymptomatic. \" I don't worry about it.\"  She has no fevers, chills, or drenching night sweats. Sleep habits seem appropriate.  Ear/Nose/Throat/Mouth:  She reports improving nasal congestion. No ear pains, sore throat, nosebleeds, or sore tongue. She has no headaches. She denies any hoarseness, change in voice quality, or hemoptysis.    Ocular:  She reports no eye pain, significant change in visual acuity, double vision, or blurry vision.  Improved since cataract surgeries  Respiratory:  She reports no chronic cough, significant shortness of breathing, phlegm production, or unexplained chest wall pain.  Cardiovascular:  She reports no exertional chest pain, chest pressure, or chest heaviness. She reports no claudication. She reports no palpitations or symptomatic orthostasis.  Gastrointestinal:  She reports no pica, no dysphagia, nausea, vomiting, postprandial abdominal pain, bloating, cramping, or change in bowel habits. No dark (off oral iron) discoloration of the stool. She reports no rectal " "bleeding, constipation, or diarrhea.  Last EGD and colonoscopy in 2016. \"Hiatal hernia.\" Oral iron intolerant - constipation.  Genitourinary:  She reports a recurrent UTIs, saying she needed antibiotics in the interval.  She currently has no urinary burning, frequency, dribbling, or discoloration. She reports difficulty controlling her bladder (cough/sneeze incontinence). Wears adult pads.  She has no need to urinate frequently through the night.  Was seen by Dr. Williamson (urology) for cystoscopy, 01/30/2018.  \"All looked good.\"  Musculoskeletal:  She reports no unexplained arthralgias, myalgias, or nighttime leg cramping.  Extremities:  She reports no trouble with fluid retention or significant leg swelling.  Endocrine:  She reports no problems with excess thirst, excessive urination, vasomotor instability, or unexplained fatigue.  Heme/Lymphatic:  She reports no unexplained bleeding, bruising, petechial rashes, or swollen glands.  Skin:  She reports no itching, rashes, or lesions which won't heal.  Neuro:  She uses a walker for support due to gait unsteadiness but reports no loss of consciousness, seizures, fainting spells, or dizziness. She reports no weakness of face, arms, or legs. She has no difficulty with speech. She has no tremors.  She has no paresthesias.  Psych:  She seems generally satisfied with life. She denies depression or anxiety at this time, \"I'm okay.\" She reports no mood swings.      VITAL SIGNS: /78   Pulse 83   Temp 98.1 °F (36.7 °C)   Resp 16   Ht 162.6 cm (64\")   Wt 117 kg (259 lb)   SpO2 96%   Breastfeeding No   BMI 44.46 kg/m²  Body surface area is 2.18 meters squared.   Pain Score    01/21/20 1029   PainSc: 0-No pain       PHYSICAL EXAMINATION:  General:  She is a pleasant, obese, overly well-developed, well-nourished, and modestly-kept elderly female who is comfortable at rest. She arrived in the exam room ambulatory with a walker. She appears to be her stated age. Her " skin color is a bit pale.    Head/Neck:  The patient is anicteric and atraumatic. The oropharynx, mouth, and throat are clear. The trachea is midline. The neck is supple without evidence of jugular venous distention or cervical adenopathy.   Eyes:  The pupils are equal, round, and reactive to light. The extraocular movements are full. There is no scleral jaundice or erythema.    Chest:  The respiratory efforts are normal and unhindered. The chest is clear to auscultation and percussion. There are no wheezes, rhonchi, rales, or asymmetry of breath sounds.  Cardiovascular:  The patient has a regular cardiac rate and rhythm without murmurs, rubs, or gallops. The peripheral pulses are equal and full.  Abdomen:  The belly is soft and morbidly obese with a firm, nontender ventral hernia. Her habitus precludes an adequate exam. There is no rebound or guarding. There is no overt organomegaly, mass-effect, or tenderness. Bowel sounds are active and of normal character.  Extremities:  There is no evidence of cyanosis, clubbing, with 1+ bipedal edema.  Rheumatologic:  There is no overt evidence of rheumatoid deformities of the hands. There is no sausaging of the fingers. There is no sign of active synovitis. The gait is slow and walker supported.  Cutaneous:  There are no overt rashes, disseminated lesions, purpura, or petechiae.    Lymphatics:  There is no evidence of adenopathy in the cervical, supraclavicular, or axillary areas.  Neurologic:  The patient is alert, oriented, cooperative, and pleasant. She is appropriately conversant. She ambulated into the exam room without assistance but declined transfer from chair to exam table. There is no overt dysfunction of the motor, sensory, cerebellar systems.  Psych:  Mood and affect are appropriate for circumstance. Eye contact is appropriate. Normal judgement and decision making.      LABS      Lab Results - Last 18 Months   Lab Units 01/16/20  1014 11/04/19  0946 07/29/19  1700  "04/23/19  1700 01/22/19  1600 01/09/19  1600   IRON mcg/dL 47 56 96 77 94 89   TIBC mcg/dL 322 373 290 290 269 286   IRON SATURATION % 15* 15* 33 27 35 31   FERRITIN ng/mL 477.80* 536.60* 433.00* 400.00* 541.00* 688.00*   FOLATE ng/mL  --  12.40 7.50 11.30 >20.00  --      ASSESSMENT:  1.    Normocytic (borderline microcytic) anemia.  Stable, Hgb 10.5; MCV  89.4, 01/16/2020 (prior range:  Hgb 9.7 - 12.5; MCV 81 - 96.6).  Contributions from iron deficiency, iron underutilization (anemia of chronic disease), and chronic kidney disease.    2.    Iron deficiency.  Have dwindled some since INFeD (Fe sat < than 20%).  3.    History of chronic gastritis and hiatal hernia.  4.    Chronic kidney disease, Stage III. Baseline GFR 35.39 on 04/24/2017.  Stable, GFR 43 mL/min, 01/16/2020 (prior range:  31.6 - 52 mL/min).  5.    History of diverticulosis.  6.    Internal hemorrhoids.  7.    Arthritis.  8.    Hypertension.  9.    Oral iron failure and intolerance (constipation).  10.  Low B12.  On monthly injections.  11.  Recurrent urinary tract infections (UTIs).  None in the interval. Was seen by Dr. Williamson (urology) for cystoscopy, 01/30/2018.  \"All looked good.\"      RECOMMENDATIONS:  1.     Re:  Apprise of labs from 01/16/2020 with stable (but persistent) anemia otherwise normal CBC, repleted serum iron, repleted (477) ferritin, low (less than 20%) Fe sat (contribution from anemia of iron underutilization/anemia of chronic disease), hyperglycemia, low (stable) GFR with otherwise essentially normal CMP.  2.    Previously noted the labs from 06/16/2017 including the normal SPIEP, low GFR but otherwise stable CMP, negative SIMBA, low B12, normal folate, low serum Fe, normal TIBC, low ferritin, low transferrin saturation, normal LDH/T4 and TSH.  Also noted the negative stools for fecal occult blood (FOB) x3.  3.     Schedule Injectafer 750 mg IV x1 at [facility]  4.    The rationale and potential toxicities (including the risk " for increased mortality from stroke, myocardial infarction (MI), congestive heart failure (CHF), seizures, sepsis, allergic reactions) for ULISES support previously discussed. Questions answered.  She will agree to proceed with a trial of therapy if and when it becomes indicated.   5.    The rationale and potential toxicities (anaphylaxis included) of IV iron previously discussed. Questions answered. She will agree to a trial of therapy if and when it is deemed indicated.   6.    CBC w diff every 4 weeks and Procrit 40,000 units subcutaneously if Hgb less than 10 and hematocrit less than 30 initially, then less than 11 and less than 33 subsequently at [facility].  8.    Return to the [place] office in 12 weeks with pre-office (fasting) CMP, B12, folate, serum iron, Fe sat, ferritin, CBC with differential.    MEDICAL DECISION MAKING: Moderate Complexity  AMOUNT OF DATA: Moderate    TIME SPENT:  Face-to-face time on this encounter, as defined by the American Medical Association in the 2020 Current Procedural Terminology codebook; assessment, record review, lab review, planning and education - 25 minutes (greater than 50% face-to-face).      cc:  MD Nadja Burgos, MILLICENT

## 2020-01-21 ENCOUNTER — TELEPHONE (OUTPATIENT)
Dept: ONCOLOGY | Facility: CLINIC | Age: 75
End: 2020-01-21

## 2020-01-21 ENCOUNTER — OFFICE VISIT (OUTPATIENT)
Dept: ONCOLOGY | Facility: CLINIC | Age: 75
End: 2020-01-21

## 2020-01-21 VITALS
HEIGHT: 64 IN | DIASTOLIC BLOOD PRESSURE: 78 MMHG | RESPIRATION RATE: 16 BRPM | WEIGHT: 259 LBS | SYSTOLIC BLOOD PRESSURE: 132 MMHG | HEART RATE: 83 BPM | OXYGEN SATURATION: 96 % | BODY MASS INDEX: 44.22 KG/M2 | TEMPERATURE: 98.1 F

## 2020-01-21 DIAGNOSIS — D63.1 ANEMIA IN STAGE 3 CHRONIC KIDNEY DISEASE (HCC): ICD-10-CM

## 2020-01-21 DIAGNOSIS — K90.9 IRON MALABSORPTION: ICD-10-CM

## 2020-01-21 DIAGNOSIS — N18.30 CKD (CHRONIC KIDNEY DISEASE), STAGE III (HCC): Primary | ICD-10-CM

## 2020-01-21 DIAGNOSIS — E61.1 IRON DEFICIENCY: ICD-10-CM

## 2020-01-21 DIAGNOSIS — N18.30 ANEMIA IN STAGE 3 CHRONIC KIDNEY DISEASE (HCC): ICD-10-CM

## 2020-01-21 DIAGNOSIS — D64.9 NORMOCYTIC ANEMIA: ICD-10-CM

## 2020-01-21 PROBLEM — E66.9 ADIPOSITY: Status: ACTIVE | Noted: 2020-01-21

## 2020-01-21 PROBLEM — E11.9 DIABETES MELLITUS (HCC): Status: ACTIVE | Noted: 2020-01-21

## 2020-01-21 PROBLEM — K40.90 LEFT INGUINAL HERNIA: Status: ACTIVE | Noted: 2020-01-21

## 2020-01-21 PROBLEM — K21.9 GASTROESOPHAGEAL REFLUX DISEASE: Status: ACTIVE | Noted: 2020-01-21

## 2020-01-21 PROBLEM — K43.2 INCISIONAL HERNIA: Status: ACTIVE | Noted: 2020-01-21

## 2020-01-21 PROBLEM — K80.20 CALCULUS OF GALLBLADDER: Status: ACTIVE | Noted: 2020-01-21

## 2020-01-21 PROBLEM — J44.9 CHRONIC OBSTRUCTIVE PULMONARY DISEASE: Status: ACTIVE | Noted: 2020-01-21

## 2020-01-21 PROCEDURE — 99214 OFFICE O/P EST MOD 30 MIN: CPT | Performed by: INTERNAL MEDICINE

## 2020-01-21 RX ORDER — OXYBUTYNIN CHLORIDE 10 MG/1
10 TABLET, EXTENDED RELEASE ORAL DAILY
COMMUNITY
Start: 2020-01-06

## 2020-01-21 RX ORDER — PANTOPRAZOLE SODIUM 40 MG/1
40 TABLET, DELAYED RELEASE ORAL DAILY
COMMUNITY
Start: 2019-12-31

## 2020-01-21 RX ORDER — FAMOTIDINE 10 MG/ML
20 INJECTION, SOLUTION INTRAVENOUS AS NEEDED
Status: CANCELLED | OUTPATIENT
Start: 2020-01-28

## 2020-01-21 RX ORDER — DIPHENHYDRAMINE HYDROCHLORIDE 50 MG/ML
50 INJECTION INTRAMUSCULAR; INTRAVENOUS AS NEEDED
Status: CANCELLED | OUTPATIENT
Start: 2020-01-28

## 2020-01-21 RX ORDER — SODIUM CHLORIDE 9 MG/ML
250 INJECTION, SOLUTION INTRAVENOUS ONCE
Status: CANCELLED | OUTPATIENT
Start: 2020-01-28

## 2020-01-21 NOTE — TELEPHONE ENCOUNTER
Patient would like to re schedule her infusion for next Tuesday 1/28 for another date if possible that Wed or Thursday.

## 2020-01-21 NOTE — TELEPHONE ENCOUNTER
----- Message from Carolina Naidu MA sent at 1/21/2020 10:58 AM CST -----  Regarding: orders  Scheduled for 01/28/2020 at 9    Schedule Injectafer 750 mg IV x1 at [facility]

## 2020-01-30 ENCOUNTER — APPOINTMENT (OUTPATIENT)
Dept: ONCOLOGY | Facility: HOSPITAL | Age: 75
End: 2020-01-30

## 2020-02-06 ENCOUNTER — APPOINTMENT (OUTPATIENT)
Dept: ONCOLOGY | Facility: HOSPITAL | Age: 75
End: 2020-02-06

## 2020-02-13 ENCOUNTER — INFUSION (OUTPATIENT)
Dept: ONCOLOGY | Facility: HOSPITAL | Age: 75
End: 2020-02-13

## 2020-02-13 VITALS
TEMPERATURE: 98.1 F | OXYGEN SATURATION: 97 % | RESPIRATION RATE: 16 BRPM | DIASTOLIC BLOOD PRESSURE: 30 MMHG | HEART RATE: 60 BPM | BODY MASS INDEX: 44.56 KG/M2 | HEIGHT: 64 IN | SYSTOLIC BLOOD PRESSURE: 101 MMHG | WEIGHT: 261 LBS

## 2020-02-13 DIAGNOSIS — K90.9 IRON MALABSORPTION: Primary | ICD-10-CM

## 2020-02-13 DIAGNOSIS — E61.1 IRON DEFICIENCY: ICD-10-CM

## 2020-02-13 PROCEDURE — 25010000002 FERRIC CARBOXYMALTOSE 750 MG/15ML SOLUTION 15 ML VIAL: Performed by: INTERNAL MEDICINE

## 2020-02-13 PROCEDURE — 96365 THER/PROPH/DIAG IV INF INIT: CPT

## 2020-02-13 RX ORDER — SODIUM CHLORIDE 9 MG/ML
250 INJECTION, SOLUTION INTRAVENOUS ONCE
Status: COMPLETED | OUTPATIENT
Start: 2020-02-13 | End: 2020-02-13

## 2020-02-13 RX ORDER — DIPHENHYDRAMINE HYDROCHLORIDE 50 MG/ML
50 INJECTION INTRAMUSCULAR; INTRAVENOUS AS NEEDED
Status: DISCONTINUED | OUTPATIENT
Start: 2020-02-13 | End: 2020-02-13 | Stop reason: HOSPADM

## 2020-02-13 RX ORDER — FAMOTIDINE 10 MG/ML
20 INJECTION, SOLUTION INTRAVENOUS AS NEEDED
Status: DISCONTINUED | OUTPATIENT
Start: 2020-02-13 | End: 2020-02-13 | Stop reason: HOSPADM

## 2020-02-13 RX ADMIN — FERRIC CARBOXYMALTOSE INJECTION 750 MG: 50 INJECTION, SOLUTION INTRAVENOUS at 09:57

## 2020-02-13 RX ADMIN — SODIUM CHLORIDE 250 ML: 9 INJECTION, SOLUTION INTRAVENOUS at 09:57

## 2020-02-27 ENCOUNTER — CLINICAL SUPPORT (OUTPATIENT)
Dept: ONCOLOGY | Facility: CLINIC | Age: 75
End: 2020-02-27

## 2020-02-27 ENCOUNTER — LAB (OUTPATIENT)
Dept: ONCOLOGY | Facility: CLINIC | Age: 75
End: 2020-02-27

## 2020-02-27 VITALS
RESPIRATION RATE: 16 BRPM | TEMPERATURE: 98.6 F | OXYGEN SATURATION: 96 % | DIASTOLIC BLOOD PRESSURE: 86 MMHG | HEART RATE: 88 BPM | SYSTOLIC BLOOD PRESSURE: 138 MMHG

## 2020-02-27 DIAGNOSIS — N18.30 CKD (CHRONIC KIDNEY DISEASE), STAGE III (HCC): ICD-10-CM

## 2020-02-27 DIAGNOSIS — D64.9 NORMOCYTIC ANEMIA: ICD-10-CM

## 2020-02-27 DIAGNOSIS — N18.30 ANEMIA IN STAGE 3 CHRONIC KIDNEY DISEASE (HCC): ICD-10-CM

## 2020-02-27 DIAGNOSIS — D63.1 ANEMIA IN STAGE 3 CHRONIC KIDNEY DISEASE (HCC): ICD-10-CM

## 2020-02-27 LAB
BASOPHILS # BLD AUTO: 0 10*3/MM3 (ref 0–0.2)
BASOPHILS NFR BLD AUTO: 0 % (ref 0–1.5)
DEPRECATED RDW RBC AUTO: 46.5 FL (ref 37–54)
EOSINOPHIL # BLD AUTO: 0.18 10*3/MM3 (ref 0–0.4)
EOSINOPHIL NFR BLD AUTO: 2.6 % (ref 0.3–6.2)
ERYTHROCYTE [DISTWIDTH] IN BLOOD BY AUTOMATED COUNT: 14.4 % (ref 12.3–15.4)
HCT VFR BLD AUTO: 35.5 % (ref 34–46.6)
HGB BLD-MCNC: 11 G/DL (ref 12–15.9)
IMM GRANULOCYTES # BLD AUTO: 0.01 10*3/MM3 (ref 0–0.05)
IMM GRANULOCYTES NFR BLD AUTO: 0.1 % (ref 0–0.5)
LYMPHOCYTES # BLD AUTO: 1.45 10*3/MM3 (ref 0.7–3.1)
LYMPHOCYTES NFR BLD AUTO: 20.7 % (ref 19.6–45.3)
MCH RBC QN AUTO: 27.3 PG (ref 26.6–33)
MCHC RBC AUTO-ENTMCNC: 31 G/DL (ref 31.5–35.7)
MCV RBC AUTO: 88.1 FL (ref 79–97)
MONOCYTES # BLD AUTO: 0.62 10*3/MM3 (ref 0.1–0.9)
MONOCYTES NFR BLD AUTO: 8.9 % (ref 5–12)
NEUTROPHILS # BLD AUTO: 4.74 10*3/MM3 (ref 1.7–7)
NEUTROPHILS NFR BLD AUTO: 67.7 % (ref 42.7–76)
PLATELET # BLD AUTO: 207 10*3/MM3 (ref 140–450)
PMV BLD AUTO: 10.2 FL (ref 6–12)
RBC # BLD AUTO: 4.03 10*6/MM3 (ref 3.77–5.28)
WBC NRBC COR # BLD: 7 10*3/MM3 (ref 3.4–10.8)

## 2020-02-27 PROCEDURE — 99211 OFF/OP EST MAY X REQ PHY/QHP: CPT | Performed by: NURSE PRACTITIONER

## 2020-02-27 PROCEDURE — 85025 COMPLETE CBC W/AUTO DIFF WBC: CPT | Performed by: INTERNAL MEDICINE

## 2020-02-27 NOTE — PROGRESS NOTES
Patient here for lab evaluation to initiate Retacrit due to stage III chronic kidney disease.  States that she is feeling better since getting an infusion of iron.  Patient received Injectafer 750mg on 02/13/2020.  Skin w/d to touch.  Color wnl.  Eating and drinking well.  Parameters set for Retacrit 40,000 units SQ for Hgb <10 and Hct <30.  Reviewed labs with patient, Hgb 11 and Hct 35.5.  Patient does not meet guidelines to initiate Retacrit at this time.  Will return in 4 weeks for same.  Instructed to call with any problems  Patient v/u.

## 2020-03-31 ENCOUNTER — TELEPHONE (OUTPATIENT)
Dept: ONCOLOGY | Facility: CLINIC | Age: 75
End: 2020-03-31

## 2020-03-31 NOTE — TELEPHONE ENCOUNTER
----- Message from Anish Banks RN sent at 3/31/2020  1:35 PM CDT -----  That is fine. Her labs are stable  ----- Message -----  From: Wandy Biggs  Sent: 3/31/2020   1:05 PM CDT  To: Anish Banks RN    Pt cx her lab/nurse appt for [facility] 4/2/20. Please review. Her next appt is sched for 4/23/20.

## 2020-04-23 ENCOUNTER — CLINICAL SUPPORT (OUTPATIENT)
Dept: ONCOLOGY | Facility: CLINIC | Age: 75
End: 2020-04-23

## 2020-04-23 ENCOUNTER — LAB (OUTPATIENT)
Dept: ONCOLOGY | Facility: CLINIC | Age: 75
End: 2020-04-23

## 2020-04-23 VITALS
OXYGEN SATURATION: 96 % | SYSTOLIC BLOOD PRESSURE: 110 MMHG | HEART RATE: 64 BPM | TEMPERATURE: 97.8 F | DIASTOLIC BLOOD PRESSURE: 60 MMHG | RESPIRATION RATE: 18 BRPM

## 2020-04-23 DIAGNOSIS — N18.30 CKD (CHRONIC KIDNEY DISEASE), STAGE III (HCC): ICD-10-CM

## 2020-04-23 DIAGNOSIS — N18.30 ANEMIA IN STAGE 3 CHRONIC KIDNEY DISEASE (HCC): ICD-10-CM

## 2020-04-23 DIAGNOSIS — D63.1 ANEMIA IN STAGE 3 CHRONIC KIDNEY DISEASE (HCC): ICD-10-CM

## 2020-04-23 DIAGNOSIS — D64.9 NORMOCYTIC ANEMIA: ICD-10-CM

## 2020-04-23 LAB
ALBUMIN SERPL-MCNC: 3.8 G/DL (ref 3.5–5.2)
ALBUMIN/GLOB SERPL: 1 G/DL
ALP SERPL-CCNC: 75 U/L (ref 39–117)
ALT SERPL W P-5'-P-CCNC: 12 U/L (ref 1–33)
ANION GAP SERPL CALCULATED.3IONS-SCNC: 15 MMOL/L (ref 5–15)
AST SERPL-CCNC: 16 U/L (ref 1–32)
BASOPHILS # BLD AUTO: 0 10*3/MM3 (ref 0–0.2)
BASOPHILS NFR BLD AUTO: 0 % (ref 0–1.5)
BILIRUB SERPL-MCNC: 0.3 MG/DL (ref 0.2–1.2)
BUN BLD-MCNC: 20 MG/DL (ref 8–23)
BUN/CREAT SERPL: 18.5 (ref 7–25)
CALCIUM SPEC-SCNC: 9.8 MG/DL (ref 8.6–10.5)
CHLORIDE SERPL-SCNC: 95 MMOL/L (ref 98–107)
CO2 SERPL-SCNC: 24 MMOL/L (ref 22–29)
CREAT BLD-MCNC: 1.08 MG/DL (ref 0.57–1)
DEPRECATED RDW RBC AUTO: 46.9 FL (ref 37–54)
EOSINOPHIL # BLD AUTO: 0.18 10*3/MM3 (ref 0–0.4)
EOSINOPHIL NFR BLD AUTO: 2.3 % (ref 0.3–6.2)
ERYTHROCYTE [DISTWIDTH] IN BLOOD BY AUTOMATED COUNT: 14.2 % (ref 12.3–15.4)
FERRITIN SERPL-MCNC: 826.1 NG/ML (ref 13–150)
GFR SERPL CREATININE-BSD FRML MDRD: 50 ML/MIN/1.73
GLOBULIN UR ELPH-MCNC: 3.9 GM/DL
GLUCOSE BLD-MCNC: 150 MG/DL (ref 65–99)
HCT VFR BLD AUTO: 34.5 % (ref 34–46.6)
HGB BLD-MCNC: 11 G/DL (ref 12–15.9)
IMM GRANULOCYTES # BLD AUTO: 0.01 10*3/MM3 (ref 0–0.05)
IMM GRANULOCYTES NFR BLD AUTO: 0.1 % (ref 0–0.5)
IRON 24H UR-MRATE: 67 MCG/DL (ref 37–145)
IRON SATN MFR SERPL: 23 % (ref 20–50)
LYMPHOCYTES # BLD AUTO: 1.36 10*3/MM3 (ref 0.7–3.1)
LYMPHOCYTES NFR BLD AUTO: 17.3 % (ref 19.6–45.3)
MCH RBC QN AUTO: 28.5 PG (ref 26.6–33)
MCHC RBC AUTO-ENTMCNC: 31.9 G/DL (ref 31.5–35.7)
MCV RBC AUTO: 89.4 FL (ref 79–97)
MONOCYTES # BLD AUTO: 0.59 10*3/MM3 (ref 0.1–0.9)
MONOCYTES NFR BLD AUTO: 7.5 % (ref 5–12)
NEUTROPHILS # BLD AUTO: 5.73 10*3/MM3 (ref 1.7–7)
NEUTROPHILS NFR BLD AUTO: 72.8 % (ref 42.7–76)
PLATELET # BLD AUTO: 216 10*3/MM3 (ref 140–450)
PMV BLD AUTO: 10.2 FL (ref 6–12)
POTASSIUM BLD-SCNC: 4.1 MMOL/L (ref 3.5–5.2)
PROT SERPL-MCNC: 7.7 G/DL (ref 6–8.5)
RBC # BLD AUTO: 3.86 10*6/MM3 (ref 3.77–5.28)
SODIUM BLD-SCNC: 134 MMOL/L (ref 136–145)
TIBC SERPL-MCNC: 292 MCG/DL (ref 298–536)
TRANSFERRIN SERPL-MCNC: 196 MG/DL (ref 200–360)
WBC NRBC COR # BLD: 7.87 10*3/MM3 (ref 3.4–10.8)

## 2020-04-23 PROCEDURE — 84466 ASSAY OF TRANSFERRIN: CPT | Performed by: INTERNAL MEDICINE

## 2020-04-23 PROCEDURE — 85025 COMPLETE CBC W/AUTO DIFF WBC: CPT | Performed by: INTERNAL MEDICINE

## 2020-04-23 PROCEDURE — 80053 COMPREHEN METABOLIC PANEL: CPT | Performed by: INTERNAL MEDICINE

## 2020-04-23 PROCEDURE — 82728 ASSAY OF FERRITIN: CPT | Performed by: INTERNAL MEDICINE

## 2020-04-23 PROCEDURE — 99211 OFF/OP EST MAY X REQ PHY/QHP: CPT | Performed by: NURSE PRACTITIONER

## 2020-04-23 PROCEDURE — 82607 VITAMIN B-12: CPT | Performed by: INTERNAL MEDICINE

## 2020-04-23 PROCEDURE — 83540 ASSAY OF IRON: CPT | Performed by: INTERNAL MEDICINE

## 2020-04-23 PROCEDURE — 82746 ASSAY OF FOLIC ACID SERUM: CPT | Performed by: INTERNAL MEDICINE

## 2020-04-23 NOTE — PROGRESS NOTES
Patient here for lab evaluation to initiate Retacrit for stage III chronic kidney disease.  States that she is feeling ok today and has been feeling better lately.  Skin w/d to touch.  Color wnl.  Eating and drinking well.  Parameters set for Retacrit 40,000 units SQ for Hgb <10 and Hct <30.  Reviewed labs with patient, Hgb 11 and Hct 34.5 today.  Patient does not meet parameters to initiate Retacrit today.  Will return in 6 weeks for same. Instructed to call with any problems.  Patient v/u.

## 2020-04-24 ENCOUNTER — TELEPHONE (OUTPATIENT)
Dept: ONCOLOGY | Facility: CLINIC | Age: 75
End: 2020-04-24

## 2020-04-24 LAB
FOLATE SERPL-MCNC: 11.7 NG/ML (ref 4.78–24.2)
VIT B12 BLD-MCNC: 164 PG/ML (ref 211–946)

## 2020-04-24 RX ORDER — FOLIC ACID 1 MG/1
1 TABLET ORAL DAILY
Qty: 30 TABLET | Refills: 3 | Status: SHIPPED | OUTPATIENT
Start: 2020-04-24 | End: 2020-09-11 | Stop reason: SDUPTHER

## 2020-04-24 NOTE — TELEPHONE ENCOUNTER
----- Message from Vignesh Holman MD sent at 4/24/2020  8:01 AM CDT -----  Labs, 04/24/2020- B12 164 (low). pls call in [place] po daily # 30

## 2020-04-24 NOTE — TELEPHONE ENCOUNTER
Talked to Dr Tolliver regarding [place] and that insurance will not cover it.  Wants patient to take B12 1000mcg daily and Folic Acid 1mg daily.  Called daughter Torie and instructed on low B12 level and of medications above.  Instructed on medication, dosage, and frequency.  Daughter v/u.

## 2020-06-02 ENCOUNTER — TELEPHONE (OUTPATIENT)
Dept: ONCOLOGY | Facility: CLINIC | Age: 75
End: 2020-06-02

## 2020-06-02 DIAGNOSIS — N18.30 ANEMIA IN STAGE 3 CHRONIC KIDNEY DISEASE (HCC): Primary | ICD-10-CM

## 2020-06-02 DIAGNOSIS — D63.1 ANEMIA IN STAGE 3 CHRONIC KIDNEY DISEASE (HCC): Primary | ICD-10-CM

## 2020-06-02 NOTE — TELEPHONE ENCOUNTER
Patient has lab appointment on 6/4.  She had asked to get a Vitamin D included in these labs.  She was just calling back to be sure it's on the order.    [phone]

## 2020-06-02 NOTE — TELEPHONE ENCOUNTER
Cannot reach pt, no v/m. I added vitamin d level to her lab work per Dr. GORDON approval. Please let pt know if she calls back.

## 2020-06-04 ENCOUNTER — LAB (OUTPATIENT)
Dept: ONCOLOGY | Facility: CLINIC | Age: 75
End: 2020-06-04

## 2020-06-04 ENCOUNTER — CLINICAL SUPPORT (OUTPATIENT)
Dept: ONCOLOGY | Facility: CLINIC | Age: 75
End: 2020-06-04

## 2020-06-04 VITALS
OXYGEN SATURATION: 95 % | HEART RATE: 64 BPM | TEMPERATURE: 98 F | DIASTOLIC BLOOD PRESSURE: 60 MMHG | SYSTOLIC BLOOD PRESSURE: 100 MMHG | RESPIRATION RATE: 18 BRPM

## 2020-06-04 DIAGNOSIS — N18.30 CKD (CHRONIC KIDNEY DISEASE), STAGE III (HCC): ICD-10-CM

## 2020-06-04 DIAGNOSIS — D63.1 ANEMIA IN STAGE 3 CHRONIC KIDNEY DISEASE (HCC): ICD-10-CM

## 2020-06-04 DIAGNOSIS — N18.30 ANEMIA IN STAGE 3 CHRONIC KIDNEY DISEASE (HCC): ICD-10-CM

## 2020-06-04 DIAGNOSIS — C91.11 CHRONIC LYMPHOCYTIC LEUKEMIA OF B-CELL TYPE IN REMISSION (HCC): ICD-10-CM

## 2020-06-04 LAB
ALBUMIN SERPL-MCNC: 3.6 G/DL (ref 3.5–5.2)
ALBUMIN/GLOB SERPL: 1 G/DL
ALP SERPL-CCNC: 65 U/L (ref 39–117)
ALT SERPL W P-5'-P-CCNC: 11 U/L (ref 1–33)
ANION GAP SERPL CALCULATED.3IONS-SCNC: 13 MMOL/L (ref 5–15)
AST SERPL-CCNC: 15 U/L (ref 1–32)
BASOPHILS # BLD AUTO: 0.01 10*3/MM3 (ref 0–0.2)
BASOPHILS NFR BLD AUTO: 0.1 % (ref 0–1.5)
BILIRUB SERPL-MCNC: 0.4 MG/DL (ref 0.2–1.2)
BUN BLD-MCNC: 13 MG/DL (ref 8–23)
BUN/CREAT SERPL: 11 (ref 7–25)
CALCIUM SPEC-SCNC: 9.4 MG/DL (ref 8.6–10.5)
CHLORIDE SERPL-SCNC: 100 MMOL/L (ref 98–107)
CO2 SERPL-SCNC: 25 MMOL/L (ref 22–29)
CREAT BLD-MCNC: 1.18 MG/DL (ref 0.57–1)
DEPRECATED RDW RBC AUTO: 44.8 FL (ref 37–54)
EOSINOPHIL # BLD AUTO: 0.23 10*3/MM3 (ref 0–0.4)
EOSINOPHIL NFR BLD AUTO: 3 % (ref 0.3–6.2)
ERYTHROCYTE [DISTWIDTH] IN BLOOD BY AUTOMATED COUNT: 13.2 % (ref 12.3–15.4)
GFR SERPL CREATININE-BSD FRML MDRD: 45 ML/MIN/1.73
GLOBULIN UR ELPH-MCNC: 3.6 GM/DL
GLUCOSE BLD-MCNC: 147 MG/DL (ref 65–99)
HCT VFR BLD AUTO: 32.6 % (ref 34–46.6)
HGB BLD-MCNC: 10.5 G/DL (ref 12–15.9)
IMM GRANULOCYTES # BLD AUTO: 0.02 10*3/MM3 (ref 0–0.05)
IMM GRANULOCYTES NFR BLD AUTO: 0.3 % (ref 0–0.5)
LYMPHOCYTES # BLD AUTO: 1.44 10*3/MM3 (ref 0.7–3.1)
LYMPHOCYTES NFR BLD AUTO: 18.6 % (ref 19.6–45.3)
MCH RBC QN AUTO: 29.2 PG (ref 26.6–33)
MCHC RBC AUTO-ENTMCNC: 32.2 G/DL (ref 31.5–35.7)
MCV RBC AUTO: 90.8 FL (ref 79–97)
MONOCYTES # BLD AUTO: 0.61 10*3/MM3 (ref 0.1–0.9)
MONOCYTES NFR BLD AUTO: 7.9 % (ref 5–12)
NEUTROPHILS # BLD AUTO: 5.43 10*3/MM3 (ref 1.7–7)
NEUTROPHILS NFR BLD AUTO: 70.1 % (ref 42.7–76)
PLATELET # BLD AUTO: 220 10*3/MM3 (ref 140–450)
PMV BLD AUTO: 10 FL (ref 6–12)
POTASSIUM BLD-SCNC: 4.2 MMOL/L (ref 3.5–5.2)
PROT SERPL-MCNC: 7.2 G/DL (ref 6–8.5)
RBC # BLD AUTO: 3.59 10*6/MM3 (ref 3.77–5.28)
SODIUM BLD-SCNC: 138 MMOL/L (ref 136–145)
WBC NRBC COR # BLD: 7.74 10*3/MM3 (ref 3.4–10.8)

## 2020-06-04 PROCEDURE — 99211 OFF/OP EST MAY X REQ PHY/QHP: CPT | Performed by: NURSE PRACTITIONER

## 2020-06-04 PROCEDURE — 85025 COMPLETE CBC W/AUTO DIFF WBC: CPT | Performed by: INTERNAL MEDICINE

## 2020-06-04 PROCEDURE — 80053 COMPREHEN METABOLIC PANEL: CPT | Performed by: INTERNAL MEDICINE

## 2020-06-04 NOTE — PROGRESS NOTES
Patient here for lab evaluation to initiate Retacrit for anemia due to stage III chronic kidney disease.  States that she is feeling good today.  Gets a little tired with exertion but nothing more than usual.  Skin w/d to touch.  Color wnl.  Eating and drinking well.  Parameters set to initiate Retacrit 40,000 units for Hgb <10 and Hct <30.  Reviewed labs with patient, Hgb 10.5 and Hct 32.6.  Will return in 6 weeks for same.  Instructed to call with any problems.  Patient v/u.

## 2020-07-15 ENCOUNTER — TELEPHONE (OUTPATIENT)
Dept: ONCOLOGY | Facility: CLINIC | Age: 75
End: 2020-07-15

## 2020-07-15 NOTE — TELEPHONE ENCOUNTER
Received call from patient Shireen Greer, she called requesting to cancel her apt linus for tomorrow 7/16/20. She has apt linus [place] 7/16/20 for lab work and possible Procrit injection  Patient states she is afraid to get out due to [facility] being a hot spot for COVID-19.   Apt was nikko for early Aug x 3 weeks out.

## 2020-07-27 ENCOUNTER — TELEPHONE (OUTPATIENT)
Dept: ONCOLOGY | Facility: CLINIC | Age: 75
End: 2020-07-27

## 2020-08-25 ENCOUNTER — TELEPHONE (OUTPATIENT)
Dept: ONCOLOGY | Facility: CLINIC | Age: 75
End: 2020-08-25

## 2020-08-25 NOTE — TELEPHONE ENCOUNTER
Called Shireen about her missed office appointment.  Stated that she had had Covid and was in the hospital for a week.  Stated her daughter will call to get her appointment rescheduled.

## 2020-08-26 ENCOUNTER — TELEPHONE (OUTPATIENT)
Dept: ONCOLOGY | Facility: CLINIC | Age: 75
End: 2020-08-26

## 2020-08-26 NOTE — TELEPHONE ENCOUNTER
Torie Winn(daughter) calling to re-schedule appointment for patient. Callback number is [phone]

## 2020-08-26 NOTE — TELEPHONE ENCOUNTER
UPDATE: Have recd recs from [facility] and spoke to dtr Torie to give appts dates/time. She vu.        Spoke with pt and she states she was released approx 2 wks ago from [facility] for COVID 19 dx. I let her know we will have to get those records and that she must be 20 day out from dx and also that Dr GORDON is only seeing pts in [place]. I have faxed rec req to [facility].

## 2020-09-11 RX ORDER — FOLIC ACID 1 MG/1
1 TABLET ORAL DAILY
Qty: 30 TABLET | Refills: 3 | Status: SHIPPED | OUTPATIENT
Start: 2020-09-11 | End: 2021-06-04

## 2020-09-11 NOTE — TELEPHONE ENCOUNTER
----- Message from Wandy Biggs sent at 9/11/2020 12:39 PM CDT -----  Pt called today. She is out of folic acid and didn't know if Dr GORDON still want her to take it. If so she will need refill to [facility].

## 2020-10-15 ENCOUNTER — TELEPHONE (OUTPATIENT)
Dept: ONCOLOGY | Facility: CLINIC | Age: 75
End: 2020-10-15

## 2020-10-15 NOTE — TELEPHONE ENCOUNTER
Caller: OMAR JOHNSON    Relationship to patient: SELF    Best call back number: [phone]     PT SAYS SHE USUALLY GETS HER ANNUAL FLU SHOT AT [facility] AND IS ASKING IF SHE CAN GET IT IN OUR FACILITY AGAIN THIS YEAR

## 2020-10-15 NOTE — TELEPHONE ENCOUNTER
Caller: OMAR JOHNSON    Relationship to patient: SELF    Best call back number: [phone]    PT IS ASKING IF HER APPT ON 10/22 CAN BE MOVED BACK TO A LITTLE LATER IN THE MORNING. IF NOT THE PT SAYS SHE WILL KEEP THE APPT AS IS.

## 2020-10-21 ENCOUNTER — TELEPHONE (OUTPATIENT)
Dept: ONCOLOGY | Facility: CLINIC | Age: 75
End: 2020-10-21

## 2020-10-21 NOTE — TELEPHONE ENCOUNTER
Caller: OMAR JOHNSON    Relationship to patient: SELF    Best call back number: [phone]    PT IS ASKING IF SHE CAN GET A FLU SHOT WHEN SHE COMES TO SEE DR THOMAS ON 10/29

## 2020-10-22 DIAGNOSIS — K90.9 IRON MALABSORPTION: Primary | ICD-10-CM

## 2020-10-27 ENCOUNTER — TELEPHONE (OUTPATIENT)
Dept: ONCOLOGY | Facility: CLINIC | Age: 75
End: 2020-10-27

## 2020-10-27 NOTE — TELEPHONE ENCOUNTER
Caller: OMAR JOHNSON    Relationship to patient: SELF    Best call back number: [phone]     PT STATES SHE HAS BEEN EXPOSED TO COVID THROUGH HER GRANDDAUGHTER AND [employer] PURCHASE WOULD NOT ALLOW HER TO GET HER BLOOD WORK DONE. PT SAYS SHE NEEDS TO CANCEL HER APPT ON 10/29, AND WILL CALL BACK TO RESCHEDULE AFTER SHE HAS SEEN HER PCP REGARDING HER EXPOSURE

## 2020-11-19 ENCOUNTER — TELEPHONE (OUTPATIENT)
Dept: ONCOLOGY | Facility: CLINIC | Age: 75
End: 2020-11-19

## 2020-11-19 DIAGNOSIS — N18.30 ANEMIA IN STAGE 3 CHRONIC KIDNEY DISEASE, UNSPECIFIED WHETHER STAGE 3A OR 3B CKD (HCC): Primary | ICD-10-CM

## 2020-11-19 DIAGNOSIS — D63.1 ANEMIA IN STAGE 3 CHRONIC KIDNEY DISEASE, UNSPECIFIED WHETHER STAGE 3A OR 3B CKD (HCC): Primary | ICD-10-CM

## 2020-11-19 NOTE — TELEPHONE ENCOUNTER
----- Message from Wandy Biggs sent at 11/19/2020  3:03 PM CST -----  Please enter lab orders. Thanks.    pre-office (fasting) CMP, B12, folate, serum iron, Fe sat, ferritin, CBC with differential.

## 2020-11-19 NOTE — TELEPHONE ENCOUNTER
Spoke with elsie Vogt and made appt w/Dr Holman 12/7/20. Pt needs fasting pre office labs and will fax orders to [facility] for this. Have sent msg to [facility] to enter lab orders.

## 2020-11-19 NOTE — TELEPHONE ENCOUNTER
Problem: Hemodialysis  Goal: Dialysis: Safe, effective, and comfortable hemodialysis treatment (Hemodialysis nurse only)  Outcome: Outcome Met, Continue evaluating goal progress toward completion  Note: Pt. Was ordered a 3 hour Tx. With a 1 kg. Weight loss.  Dr. Whalen rounded mid Tx. And changed his orders to min. UF for remainder of Tx. And reduce time to 2.5 hours.  Pt. Reached goal with some transient nausea that responded well to IV Zofran.  Goal: Dialysis: Free of complications related to initiation/termination of dialysis (Hemodialysis nurse only)  Outcome: Outcome Met, Continue evaluating goal progress toward completion  Note: Pt.'s CVC aspirated easily and ran well the entire Tx.  Her CVC was capped with Sodium Citrate per protocol post Tx.      Patient's daughter, Torie, calling to r/s appt patient missed on 10/29.    Any day is fine, but she needs a mid-morning appointment.    [phone]

## 2020-12-03 ENCOUNTER — TELEPHONE (OUTPATIENT)
Dept: ONCOLOGY | Facility: CLINIC | Age: 75
End: 2020-12-03

## 2020-12-03 NOTE — TELEPHONE ENCOUNTER
Spoke w/dtr Torie to let her know Dr GORDON will do telephone visit on 12/7/20. Pt is [place] [facility] [place]. Torie asked that we call pt tel [phone] for the call.

## 2020-12-03 NOTE — PROGRESS NOTES
[facility] ONCOLOGY  [address] [place] [address] [address]  [facility] [address]  [phone]    Patient Name: Shireen Greer  Encounter Date: 01/21/2020  YOB: 1945  Patient Number: [id number]      This procedure has been fully reviewed with the patient and written informed consent has been obtained.    TELEPHONE VISIT: Shireen Greer is a 75-year-old female who returns in follow-up of anemia with iron and B12 deficiencies.  She was on ferrous sulfate from 05/2017 before it was stopped on 10/16/2017 when she received an initial infusion of INFeD 500 mg and was started on B12 monthly injections beginning 10/04/2017. It has been nearly 10 months since last receipt of IV Injectafer last 02/13/2020.  She is on the phone alone (usually visits with her daughter, Torie). History is obtained from the patient who is considered reliable.      DIAGNOSTIC ABNORMALITIES:  1.Colonoscopy, 06/24/2016.  Diverticular disease. Small rectal internal hemorrhoids. Dr. Villar.  2.EGD, 06/24/2017. Irregular Z-line at EG junction. Biopsies taken to rule out Ortiz's metaplasia. 6 mm long sliding hiatal hernia. Mild gastritis, biopsy taken. Small bowel biopsies taken to rule out celiac disease. Capsule endoscopy recommended as outpatient.  3.Labs 04/24/2017, Dr. Barron: Hemoglobin 9.9, hematocrit 31.1, MCV 81 (81 - 99), MCH 25.8 (27 - 31), MCHC 31.8 (33 - 37), RDW 14.8 (12.3 - 15.1), platelets 272,000, WBC 7.9, normal differential. CMP: Sodium 135 (depressed), glucose 132, BUN 51, creatinine 1.54 (each elevated), EGFR 35.39 (depressed), calcium 8.8, total protein 7.5, total bilirubin 0.3, AST 17, ALT 24, alkaline phos 73 (each normal). Serum iron 31 (50 - 175), TIBC 349 (250 - 450), B12 of 865, ferritin 40.  4.Labs, 06/16/2017: Hemoglobin 10. 5, hematocrit 33.5, MCV 82.4, MCH 25.9, MCHC 31.3 (each depressed), platelets 230,000, WBC 8.0, glucose 127, BUN 40, creatinine 1.4 (each elevated), GFR 39.5  (low),  (normal) iron 49, Fe sat 12.3%, ferritin 43, B12 of 236 (each depressed), folate 7.1, SIMBA negative, SPIEP normal. JULIAN:  No monoclonal immunoglobulins, TSH 3.5, T4 of 9.3 (each normal),  5.Stools FOB, negative x3, 06/27/2017.     PREVIOUS INTERVENTIONS:    1.Ferrous sulfate 325 mg p.o. twice daily since 05/2017 (per patient) through 10/16/2017.  2.B12, 1000 mcg monthly beginning 10/04/2017 and 11/06/2017.  3.INFeD 500 mg on 10/16/2017; 12/13/2017; 02/06/2018; 04/11/2018.  4.Venofer 200 mg IV, 11/12, 11/13, 11/14/2018 (600 mg)    ADULT ILLNESSES:    •Microcytic anemia ( ICD-10:D50.9 ;Iron deficiency anemia, unspecified  •Anemia ( ICD-10:D64.9 ;Anemia, unspecified  •Anxiety ( ICD-10:F41.9 ;Anxiety disorder, unspecified  •Arthritis (disorder) ( ICD-10:M19.90 ;Unspecified osteoarthritis, unspecified site  •Chronic kidney disease ( Stage III. GFR 35.39 mL/minute, 04/24/2017; ICD-10:N18.3 ;Chronic kidney disease, stage 3 (moderate) )  •Depression ( ICD-10:F32.9 ;Major depressive disorder, single episode, unspecified  •Diverticulosis ( history of; ICD-10:K57.90 ;Diverticulosis of intestine, part unspecified, without perforation or abscess without bleeding )  •Drug intolerance ( oral iron; ICD-10:T45.4x5D ;Adverse effect of iron and its compounds, subsequent encounter )  •Gastritis (disorder) ( ICD-10:K29.70 ;Gastritis, unspecified, without bleeding  •Hypertension ( ICD-10:I10 ;Essential (primary) hypertension  •Internal hemorrhoids ( ICD-10:K64.8 ;Other hemorrhoids  •Iron deficiency anemia (disorder) ( ICD-10:D50.9 ;Iron deficiency anemia, unspecified  •Malabsorption ( ICD-10:K90.9 ;Intestinal malabsorption, unspecified  •Urinary tract infection ( recurrent; ICD-10:N39.0 ;Urinary tract infection, site not specified )  •Vitamin B12 deficiency ( ICD-10:E53.8 ;Deficiency of other specified B group vitamins    SURGERIES:    •Bilateral cataracts, 11/2019 and 12/2019  •Hernia repair, 2011  •Partial colectomy with  "hernia repair  •Cystoscopy, 01/30/2018, Dr. Aguilera (urology). Recurrent urinary tract infections. \"All looked good\"  •Total abdominal hysterectomy with bilateral salpingo-oophorectomy, 2011  •EGD, 2016. \"Hiatal hernia\"  •EGD, 06/24/2017. Irregular Z-line at EG junction. Biopsies taken to rule out Ortiz's metaplasia. A 6 mm long sliding hiatal hernia. Mild gastritis, biopsy taken. Small bowel biopsies taken to rule out celiac disease. Capsule endoscopy recommended as outpatient  •Colonoscopy, 06/24/2016. Diverticular disease. Small rectal internal hemorrhoids. Dr. Villar      Problem List Items Addressed This Visit        Genitourinary    Anemia in stage 3 chronic kidney disease - Primary       Hematopoietic and Hemostatic    Normocytic anemia    Overview     DIAGNOSTIC ABNORMALITIES:  Colonoscopy, 06/24/2016.  Diverticular disease. Small rectal internal hemorrhoids. Dr. Villar.  EGD, 06/24/2017. Irregular Z-line at EG junction. Biopsies taken to rule out Ortiz's metaplasia. 6 mm long sliding hiatal hernia. Mild gastritis, biopsy taken. Small bowel biopsies taken to rule out celiac disease. Capsule endoscopy recommended as outpatient.  Labs 04/24/2017, Dr. Barron: Hemoglobin 9.9, hematocrit 31.1, MCV 81 (81 - 99), MCH 25.8 (27 - 31), MCHC 31.8 (33 - 37), RDW 14.8 (12.3 - 15.1), platelets 272,000, WBC 7.9, normal differential. CMP: Sodium 135 (depressed), glucose 132, BUN 51, creatinine 1.54 (each elevated), EGFR 35.39 (depressed), calcium 8.8, total protein 7.5, total bilirubin 0.3, AST 17, ALT 24, alkaline phos 73 (each normal). Serum iron 31 (50 - 175), TIBC 349 (250 - 450), B12 of 865, ferritin 40.\[id number], 06/16/2017: Hemoglobin 10. 5, hematocrit 33.5, MCV 82.4, MCH 25.9, MCHC 31.3 (each depressed), platelets 230,000, WBC 8.0, glucose 127, BUN 40, creatinine 1.4 (each elevated), GFR 39.5 (low),  (normal) iron 49, Fe sat 12.3%, ferritin 43, B12 of 236 (each depressed), folate 7.1, SIMBA negative, SPIEP " normal. JULIAN:  No monoclonal immunoglobulins, TSH 3.5, T4 of 9.3 (each normal),Stools FOB, negative x3, 06/27/2017.    PREVIOUS INTERVENTIONS:  Ferrous sulfate 325 mg p.o. twice daily since 05/2017 (per patient) through 10/16/2017.    B12, 1000 mcg monthly beginning 10/04/2017 and 11/06/2017.    INFeD 500 mg on 10/16/2017; 12/13/2017; 02/06/2018; 04/11/2018.    Venofer 200 mg IV, 11/12, 11/13, 11/14/2018 (600 mg)             Oncology/Hematology History    No history exists.       PAST MEDICAL HISTORY:  ALLERGIES:  No Known Allergies  CURRENT MEDICATIONS:  Outpatient Encounter Medications as of 12/7/2020   Medication Sig Dispense Refill   • calcitriol (ROCALTROL) 0.25 MCG capsule calcitriol 0.25 mcg capsule     • Cholecalciferol (D3-1000) 1000 units tablet D3-2000 2,000 unit capsule   TAKE ONE CAPSULE BY MOUTH ONCE DAILY.     • folic acid (FOLVITE) 1 MG tablet Take 1 tablet by mouth Daily. 30 tablet 3   • furosemide (LASIX) 20 MG tablet Take 20 mg by mouth Daily.     • metoprolol succinate XL (TOPROL-XL) 50 MG 24 hr tablet metoprolol succinate ER 50 mg tablet,extended release 24 hr     • olmesartan (BENICAR) 20 MG tablet olmesartan 20 mg tablet     • oxybutynin XL (DITROPAN-XL) 10 MG 24 hr tablet      • pantoprazole (PROTONIX) 40 MG EC tablet      • vitamin C (ASCORBIC ACID) 250 MG tablet Take 250 mg by mouth Daily.     • [DISCONTINUED] Ergocalciferol 2000 units tablet Take 2,000 Int'l Units by mouth Daily.     • [DISCONTINUED] losartan (COZAAR) 50 MG tablet losartan 50 mg tablet   TAKE ONE TABLET BY MOUTH EVERY DAY     • [DISCONTINUED] triamterene-hydrochlorothiazide (DYAZIDE) 37.5-25 MG per capsule triamterene 37.5 mg-hydrochlorothiazide 25 mg capsule   Take 1 capsule every day by oral route for 30 days.       No facility-administered encounter medications on file as of 12/7/2020.      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 Small rectal internal hemorrhoids. Dr. Villar   • CYSTOSCOPY  01/30/2018    Dr. Aguilera (urology). Recurrent urinary tract infections. \"All looked good\"   • ENDOSCOPY  2016    \"Hiatal hernia\"   • ENDOSCOPY  06/24/2017     Irregular Z-line at EG junction. Biopsies taken to rule out Ortiz's metaplasia. A 6 mm long sliding hiatal hernia. Mild gastritis, biopsy taken. Small bowel biopsies taken to rule out celiac disease. Capsule endoscopy recommended as outpatient   • HERNIA REPAIR  2011   • TOTAL ABDOMINAL HYSTERECTOMY WITH SALPINGO OOPHORECTOMY  2011     HEALTH MAINTENANCE ITEMS:  Health Maintenance Due   Topic Date Due   • MAMMOGRAM  1945   • URINE MICROALBUMIN  1945   • TDAP/TD VACCINES (1 - Tdap) 09/08/1965   • ZOSTER VACCINE (1 of 2) 09/08/1996   • Pneumococcal Vaccine 65+ (1 of 1 - PPSV23) 09/08/2011   • HEPATITIS C SCREENING  08/12/2019   • ANNUAL WELLNESS VISIT  08/12/2019   • DIABETIC FOOT EXAM  08/12/2019   • HEMOGLOBIN A1C  08/12/2019   • DIABETIC EYE EXAM  08/12/2019   • INFLUENZA VACCINE  08/01/2020       <no information>  Last Completed Colonoscopy       Status Date      COLONOSCOPY Done 6/24/2016 HM COLONOSCOPY HM Colonoscopy           Patient has more history with this " "topic...          There is no immunization history on file for this patient.  Last Completed Mammogram       Status Date      MAMMOGRAM No completions recorded            FAMILY HISTORY:  History reviewed. No pertinent family history.  SOCIAL HISTORY:  Social History     Socioeconomic History   • Marital status:      Spouse name: Not on file   • Number of children: Not on file   • Years of education: Not on file   • Highest education level: Not on file   Tobacco Use   • Smoking status: Former Smoker   • Smokeless tobacco: Never Used       REVIEW OF SYSTEMS:  Constitutional:  The patient's appetite is fairly good. \"Better since I was last in hospital in August for COVID and UTI.\"  Her energy is chronically low.  \"More than I did.\" She manages her personal ADLs and some house chores. Says she is driven around by her children.  Her son lives with her and the others live nearby.  Thinks she has lost weight, \"some.\" She has no fevers, chills, or drenching night sweats. Sleep habits seem appropriate.  Ear/Nose/Throat/Mouth:  No ear pains, sore throat, nosebleeds, or sore tongue. She has no headaches. She denies any hoarseness, change in voice quality.    Ocular:  She reports no eye pain, significant change in visual acuity, double vision, or blurry vision.  Improved since cataract surgeries. Is followed by Dr. Magdaleno  Respiratory:  She reports no chronic cough, significant shortness of breathing, phlegm production, or unexplained chest wall pain.  Cardiovascular:  She reports no exertional chest pain, chest pressure, or chest heaviness. She reports no claudication. She reports no palpitations or symptomatic orthostasis.  Gastrointestinal:  She reports no pica, no dysphagia, nausea, vomiting, postprandial abdominal pain, bloating, cramping, or change in bowel habits. No dark (off oral iron) discoloration of the stool. She reports no rectal bleeding, constipation, or diarrhea.  Last EGD and colonoscopy in 2016. " "\"Hiatal hernia.\" Oral iron intolerant - constipation.  Genitourinary:  She reports recurrent UTIs, saying she was admitted to [facility] last 07/2020 for a week.  She currently has no urinary burning, frequency, dribbling, or discoloration. She reports difficulty controlling her bladder (cough/sneeze incontinence). Wears adult pads.  She has no need to urinate frequently through the night.  Was seen by Dr. Williamson (urology) for cystoscopy, 01/30/2018.  \"All looked good.\"  Musculoskeletal:  She reports no unexplained arthralgias, myalgias, or nighttime leg cramping.  Extremities:  She reports no trouble with fluid retention or significant leg swelling.  Endocrine:  She reports no problems with excess thirst, excessive urination, vasomotor instability, or unexplained fatigue.  Heme/Lymphatic:  She reports no unexplained bleeding, bruising, petechial rashes, or swollen glands.  Skin:  She reports no itching, rashes, or lesions which won't heal.  Neuro:  She uses a walker for support due to gait unsteadiness but reports no loss of consciousness, seizures, fainting spells, or dizziness. She reports no weakness of face, arms, or legs. She has no difficulty with speech. She has no tremors.  She has no paresthesias.  Psych:  She seems generally satisfied with life. She has bouts of depression and anxiety at this time, \"I'm okay most of the time.\"    PRIOR: 259 lbs    VITAL SIGNS: Ht 162.6 cm (64\")   BMI 44.80 kg/m²  Body surface area is 2.19 meters squared.   Pain Score    12/07/20 1058   PainSc: 0-No pain       PHYSICAL EXAMINATION:  Neurologic:  The patient is alert, oriented, cooperative, and pleasant. She is appropriately conversant.   Psych:  Mood and affect are appropriate for circumstance.      LABS      Lab Results - Last 18 Months   Lab Units 04/23/20  0911 01/16/20  1014 11/04/19  0946 07/29/19  1700   IRON mcg/dL 67 47 56 96   TIBC mcg/dL 292* 322 373 290   IRON SATURATION % 23 15* 15* 33   FERRITIN ng/mL 826.10* " "477.80* 536.60* 433.00*   FOLATE ng/mL 11.70  --  12.40 7.50     ASSESSMENT:  1.    Normocytic (borderline microcytic) anemia.  Stable, Hgb 11.6; MCV  91, 12/05/2020 (prior range:  Hgb 9.7 - 12.5; MCV 81 - 96.6).  Contributions from iron deficiency, iron underutilization (anemia of chronic disease), and chronic kidney disease.    2.    Iron deficiency.  Repleted with ferritin > 600, 12/05/2020 since last Injectafer.  3.    History of chronic gastritis and hiatal hernia.  4.    Chronic kidney disease, Stage III. Baseline GFR 35.39 on 04/24/2017.  Stable, GFR >60 mL/min, 12/05/2020 (prior range:  31.6 - 52 mL/min).  5.    History of diverticulosis.  6.    Internal hemorrhoids.  7.    Arthritis.  8.    Hypertension.  9.    Oral iron failure and intolerance (constipation).  10.  Low B12.  Has not been taking shots  11.  Recurrent urinary tract infections (UTIs).    --Was seen by Dr. Williamson (urology) for cystoscopy, 01/30/2018.  \"All looked good.\"  -- Says she [place] [facility] 08/07/2020 for COVID symptoms and UTI      RECOMMENDATIONS:  1.     Re:  Apprise of labs from 11/19/2020 AND 12/05/2020 with stable (but persistent) anemia otherwise normal CBC, depleted B12 (repleted 128), elevated folate (87- instructed to stop oral folate),  repleted (685) ferritin, pending Fe sat (contribution from anemia of iron underutilization/anemia of chronic disease), hyperglycemia, (stable) GFR with otherwise essentially normal CMP.  2.    Schedule B12 1000 mcg IM weekly x 4 then q month till her next visit.  3.    Previously noted the labs from 06/16/2017 including the normal SPIEP, low GFR but otherwise stable CMP, negative SIMBA, low B12, normal folate, low serum Fe, normal TIBC, low ferritin, low transferrin saturation, normal LDH/T4 and TSH.  Also noted the negative stools for fecal occult blood (FOB) x3.  4.    The rationale and potential toxicities (including the risk for increased mortality from " stroke, myocardial infarction (MI), congestive heart failure (CHF), seizures, sepsis, allergic reactions) for ULISES support previously discussed. Questions answered.  She will agree to proceed with a trial of therapy if and when it becomes indicated.   5.    The rationale and potential toxicities (anaphylaxis included) of IV iron previously discussed. Questions answered. She will agree to a trial of therapy if and when it is deemed indicated.   6.    HOLD - CBC w diff every 4 weeks and HOLD Procrit (GFR > 60)  7.    Return to the [place] office in 12 weeks with pre-office (fasting) CMP, B12, folate, serum iron, Fe sat, ferritin, CBC with differential.    MEDICAL DECISION MAKING: Moderate Complexity  AMOUNT OF DATA: Moderate    I spent 25 total minutes, caring for Shireen today.  100% of this time involved counseling and/or coordination of care as documented within this note regarding the patient's illness(es), pros and cons of various treatment options, instructions and/or risk reduction.    cc:  MD Nadja Burgos APRN

## 2020-12-03 NOTE — TELEPHONE ENCOUNTER
PT: OMAR JOHNSON    PT  DAUGHTER MAGDALENO CALLING TOT SAY SHE'S GOING TO TAKE MOM TO LABS, WANTS TO SEE IF Monday'S VISIT CAN BE A VIDEO INSTEAD OF IN OFFICE DUE TO THE COVID #'S GOING UP.    PT DAUGHTER #: [phone]

## 2020-12-07 ENCOUNTER — OFFICE VISIT (OUTPATIENT)
Dept: ONCOLOGY | Facility: CLINIC | Age: 75
End: 2020-12-07

## 2020-12-07 VITALS — BODY MASS INDEX: 44.8 KG/M2 | HEIGHT: 64 IN

## 2020-12-07 DIAGNOSIS — D63.1 ANEMIA IN STAGE 3 CHRONIC KIDNEY DISEASE, UNSPECIFIED WHETHER STAGE 3A OR 3B CKD (HCC): Primary | ICD-10-CM

## 2020-12-07 DIAGNOSIS — D64.9 NORMOCYTIC ANEMIA: ICD-10-CM

## 2020-12-07 DIAGNOSIS — N18.30 ANEMIA IN STAGE 3 CHRONIC KIDNEY DISEASE, UNSPECIFIED WHETHER STAGE 3A OR 3B CKD (HCC): Primary | ICD-10-CM

## 2020-12-07 PROCEDURE — 99443 PR PHYS/QHP TELEPHONE EVALUATION 21-30 MIN: CPT | Performed by: INTERNAL MEDICINE

## 2020-12-07 RX ORDER — OLMESARTAN MEDOXOMIL 20 MG/1
TABLET ORAL
Status: ON HOLD | COMMUNITY
End: 2023-01-21

## 2020-12-07 RX ORDER — MULTIVIT WITH MINERALS/LUTEIN
250 TABLET ORAL DAILY
Status: ON HOLD | COMMUNITY
End: 2023-01-21

## 2020-12-08 ENCOUNTER — TELEPHONE (OUTPATIENT)
Dept: ONCOLOGY | Facility: CLINIC | Age: 75
End: 2020-12-08

## 2020-12-08 NOTE — TELEPHONE ENCOUNTER
Patient would like to find out if Dr. Holman is ok with her getting a flu vaccine.    [phone]

## 2021-01-07 ENCOUNTER — TELEPHONE (OUTPATIENT)
Dept: ONCOLOGY | Facility: CLINIC | Age: 76
End: 2021-01-07

## 2021-01-07 NOTE — TELEPHONE ENCOUNTER
Provider: CONCEPCION   Caller: OMAR JOHNSON  Relationship to Patient: SELF    Phone Number: [phone]  Reason for Call: PT WANTS TO KNOW WHERE AND WHEN SHE IS SUPPOSE TO GET HER B12 SHOTS

## 2021-01-14 ENCOUNTER — CLINICAL SUPPORT (OUTPATIENT)
Dept: ONCOLOGY | Facility: CLINIC | Age: 76
End: 2021-01-14

## 2021-01-14 ENCOUNTER — TELEPHONE (OUTPATIENT)
Dept: ONCOLOGY | Facility: CLINIC | Age: 76
End: 2021-01-14

## 2021-01-14 DIAGNOSIS — E53.8 B12 DEFICIENCY: Primary | ICD-10-CM

## 2021-01-14 PROCEDURE — 96372 THER/PROPH/DIAG INJ SC/IM: CPT | Performed by: INTERNAL MEDICINE

## 2021-01-14 RX ORDER — CYANOCOBALAMIN 1000 UG/ML
1000 INJECTION, SOLUTION INTRAMUSCULAR; SUBCUTANEOUS ONCE
Status: CANCELLED | OUTPATIENT
Start: 2021-01-27

## 2021-01-14 RX ORDER — CYANOCOBALAMIN 1000 UG/ML
1000 INJECTION, SOLUTION INTRAMUSCULAR; SUBCUTANEOUS ONCE
Status: COMPLETED | OUTPATIENT
Start: 2021-01-14 | End: 2021-01-14

## 2021-01-14 RX ORDER — CYANOCOBALAMIN 1000 UG/ML
1000 INJECTION, SOLUTION INTRAMUSCULAR; SUBCUTANEOUS ONCE
Status: CANCELLED | OUTPATIENT
Start: 2021-01-21

## 2021-01-14 RX ORDER — CYANOCOBALAMIN 1000 UG/ML
1000 INJECTION, SOLUTION INTRAMUSCULAR; SUBCUTANEOUS ONCE
Status: CANCELLED | OUTPATIENT
Start: 2021-01-14

## 2021-01-14 RX ADMIN — CYANOCOBALAMIN 1000 MCG: 1000 INJECTION, SOLUTION INTRAMUSCULAR; SUBCUTANEOUS at 11:15

## 2021-01-14 NOTE — PROGRESS NOTES
Patient here to initiate weekly B12 injections.  Will be getting injections weekly for 4 weeks then monthly for vitamin B12 deficiency.  States that she is feeling good today.  No c/o voiced.  Skin w/d to touch. Color wnl.  Will return in one week for same.  Instructed to call with any problems.  Patient v/u.

## 2021-01-21 ENCOUNTER — TELEPHONE (OUTPATIENT)
Dept: ONCOLOGY | Facility: CLINIC | Age: 76
End: 2021-01-21

## 2021-01-21 NOTE — TELEPHONE ENCOUNTER
Spoke w/pt and cx today's appt. She states she has contacted her PCP and they have prescribed med. I advised her to get back in touch with them if her fever doesn't go away or symptoms get worse. Have kept appt for 1/28/21 and will check back with her prior that appt to evaluate her sypmtoms.

## 2021-01-21 NOTE — TELEPHONE ENCOUNTER
PT: SELF    PT CALLING TO CANCEL HER APPT FOR TODAY SHE'S HAVING CHILLS, LOW GRADE TEMP, COUGHING, LOSING VOICE,...PLEASE ADVISE    CALL BACK TO R/S IF CAN?      PT #: [phone]

## 2021-01-28 ENCOUNTER — CLINICAL SUPPORT (OUTPATIENT)
Dept: ONCOLOGY | Facility: CLINIC | Age: 76
End: 2021-01-28

## 2021-01-28 DIAGNOSIS — E53.8 B12 DEFICIENCY: Primary | ICD-10-CM

## 2021-01-28 PROCEDURE — 96372 THER/PROPH/DIAG INJ SC/IM: CPT | Performed by: NURSE PRACTITIONER

## 2021-01-28 RX ORDER — CYANOCOBALAMIN 1000 UG/ML
1000 INJECTION, SOLUTION INTRAMUSCULAR; SUBCUTANEOUS ONCE
Status: CANCELLED | OUTPATIENT
Start: 2021-02-04

## 2021-01-28 RX ORDER — CYANOCOBALAMIN 1000 UG/ML
1000 INJECTION, SOLUTION INTRAMUSCULAR; SUBCUTANEOUS ONCE
Status: COMPLETED | OUTPATIENT
Start: 2021-01-28 | End: 2021-01-28

## 2021-01-28 RX ADMIN — CYANOCOBALAMIN 1000 MCG: 1000 INJECTION, SOLUTION INTRAMUSCULAR; SUBCUTANEOUS at 11:05

## 2021-01-28 NOTE — PROGRESS NOTES
Patient here for week 2 of 4 weekly B12 injections for vitamin B12 deficiency.  States that she is feeling good today.  Gets tired with exertion.  Skin w/d to touch.  Color wnl.  Eating and drinking well.  Last injection was on 01/14-patient states that she tolerated it well without side effects.  Will return in one week for same.  Instructed to call with any problems.  Patient v/u.     NDC for B12 injection [id number]

## 2021-02-04 ENCOUNTER — CLINICAL SUPPORT (OUTPATIENT)
Dept: ONCOLOGY | Facility: CLINIC | Age: 76
End: 2021-02-04

## 2021-02-04 DIAGNOSIS — E53.8 B12 DEFICIENCY: Primary | ICD-10-CM

## 2021-02-04 PROCEDURE — 96372 THER/PROPH/DIAG INJ SC/IM: CPT | Performed by: NURSE PRACTITIONER

## 2021-02-04 RX ORDER — CYANOCOBALAMIN 1000 UG/ML
1000 INJECTION, SOLUTION INTRAMUSCULAR; SUBCUTANEOUS ONCE
OUTPATIENT
Start: 2021-02-11

## 2021-02-04 RX ORDER — CYANOCOBALAMIN 1000 UG/ML
1000 INJECTION, SOLUTION INTRAMUSCULAR; SUBCUTANEOUS ONCE
Status: CANCELLED | OUTPATIENT
Start: 2021-02-11

## 2021-02-04 RX ORDER — CYANOCOBALAMIN 1000 UG/ML
1000 INJECTION, SOLUTION INTRAMUSCULAR; SUBCUTANEOUS ONCE
Status: COMPLETED | OUTPATIENT
Start: 2021-02-04 | End: 2021-02-04

## 2021-02-04 RX ADMIN — CYANOCOBALAMIN 1000 MCG: 1000 INJECTION, SOLUTION INTRAMUSCULAR; SUBCUTANEOUS at 11:18

## 2021-02-04 NOTE — PROGRESS NOTES
Patient here for week 3 of 4 weekly B12 injections due to vitamin V12 deficiency.  States that she is feeling some better since starting injections.  Still gets a little tired with minimal exertion.  Skin w/d to touch.  Color wnl.  Eating and drinking well.  Last injection was on 01/28-patient states that she tolerated it well without side effects. Will return in one week for same.  Instructed to call with any problems.  Patient v/u.

## 2021-02-23 ENCOUNTER — CLINICAL SUPPORT (OUTPATIENT)
Dept: ONCOLOGY | Facility: CLINIC | Age: 76
End: 2021-02-23

## 2021-02-23 ENCOUNTER — LAB (OUTPATIENT)
Dept: ONCOLOGY | Facility: CLINIC | Age: 76
End: 2021-02-23

## 2021-02-23 DIAGNOSIS — E53.8 B12 DEFICIENCY: Primary | ICD-10-CM

## 2021-02-23 DIAGNOSIS — D64.9 NORMOCYTIC ANEMIA: ICD-10-CM

## 2021-02-23 DIAGNOSIS — D63.1 ANEMIA IN STAGE 3 CHRONIC KIDNEY DISEASE, UNSPECIFIED WHETHER STAGE 3A OR 3B CKD (HCC): ICD-10-CM

## 2021-02-23 DIAGNOSIS — N18.30 ANEMIA IN STAGE 3 CHRONIC KIDNEY DISEASE, UNSPECIFIED WHETHER STAGE 3A OR 3B CKD (HCC): ICD-10-CM

## 2021-02-23 LAB
ALBUMIN SERPL-MCNC: 3.7 G/DL (ref 3.5–5.2)
ALBUMIN/GLOB SERPL: 0.9 G/DL
ALP SERPL-CCNC: 80 U/L (ref 39–117)
ALT SERPL W P-5'-P-CCNC: 12 U/L (ref 1–33)
ANION GAP SERPL CALCULATED.3IONS-SCNC: 9 MMOL/L (ref 5–15)
AST SERPL-CCNC: 16 U/L (ref 1–32)
BASOPHILS # BLD AUTO: 0.02 10*3/MM3 (ref 0–0.2)
BASOPHILS NFR BLD AUTO: 0.2 % (ref 0–1.5)
BILIRUB SERPL-MCNC: 0.2 MG/DL (ref 0–1.2)
BUN SERPL-MCNC: 16 MG/DL (ref 8–23)
BUN/CREAT SERPL: 16.8 (ref 7–25)
CALCIUM SPEC-SCNC: 9.3 MG/DL (ref 8.6–10.5)
CHLORIDE SERPL-SCNC: 102 MMOL/L (ref 98–107)
CO2 SERPL-SCNC: 28 MMOL/L (ref 22–29)
CREAT SERPL-MCNC: 0.95 MG/DL (ref 0.57–1)
DEPRECATED RDW RBC AUTO: 43.7 FL (ref 37–54)
EOSINOPHIL # BLD AUTO: 0.2 10*3/MM3 (ref 0–0.4)
EOSINOPHIL NFR BLD AUTO: 2.5 % (ref 0.3–6.2)
ERYTHROCYTE [DISTWIDTH] IN BLOOD BY AUTOMATED COUNT: 13.2 % (ref 12.3–15.4)
FERRITIN SERPL-MCNC: 651 NG/ML (ref 13–150)
GFR SERPL CREATININE-BSD FRML MDRD: 58 ML/MIN/1.73
GLOBULIN UR ELPH-MCNC: 3.9 GM/DL
GLUCOSE SERPL-MCNC: 112 MG/DL (ref 65–99)
HCT VFR BLD AUTO: 35.8 % (ref 34–46.6)
HGB BLD-MCNC: 11.1 G/DL (ref 12–15.9)
IMM GRANULOCYTES # BLD AUTO: 0.02 10*3/MM3 (ref 0–0.05)
IMM GRANULOCYTES NFR BLD AUTO: 0.2 % (ref 0–0.5)
IRON 24H UR-MRATE: 45 MCG/DL (ref 37–145)
IRON SATN MFR SERPL: 14 % (ref 20–50)
LYMPHOCYTES # BLD AUTO: 1.57 10*3/MM3 (ref 0.7–3.1)
LYMPHOCYTES NFR BLD AUTO: 19.2 % (ref 19.6–45.3)
MCH RBC QN AUTO: 28 PG (ref 26.6–33)
MCHC RBC AUTO-ENTMCNC: 31 G/DL (ref 31.5–35.7)
MCV RBC AUTO: 90.2 FL (ref 79–97)
MONOCYTES # BLD AUTO: 0.65 10*3/MM3 (ref 0.1–0.9)
MONOCYTES NFR BLD AUTO: 8 % (ref 5–12)
NEUTROPHILS NFR BLD AUTO: 5.7 10*3/MM3 (ref 1.7–7)
NEUTROPHILS NFR BLD AUTO: 69.9 % (ref 42.7–76)
NRBC BLD AUTO-RTO: 0 /100 WBC (ref 0–0.2)
PLATELET # BLD AUTO: 232 10*3/MM3 (ref 140–450)
PMV BLD AUTO: 11.7 FL (ref 6–12)
POTASSIUM SERPL-SCNC: 4.2 MMOL/L (ref 3.5–5.2)
PROT SERPL-MCNC: 7.6 G/DL (ref 6–8.5)
RBC # BLD AUTO: 3.97 10*6/MM3 (ref 3.77–5.28)
SODIUM SERPL-SCNC: 139 MMOL/L (ref 136–145)
TIBC SERPL-MCNC: 320 MCG/DL (ref 298–536)
TRANSFERRIN SERPL-MCNC: 215 MG/DL (ref 200–360)
WBC # BLD AUTO: 8.16 10*3/MM3 (ref 3.4–10.8)

## 2021-02-23 PROCEDURE — 85025 COMPLETE CBC W/AUTO DIFF WBC: CPT | Performed by: INTERNAL MEDICINE

## 2021-02-23 PROCEDURE — 82607 VITAMIN B-12: CPT

## 2021-02-23 PROCEDURE — 82746 ASSAY OF FOLIC ACID SERUM: CPT

## 2021-02-23 PROCEDURE — 96372 THER/PROPH/DIAG INJ SC/IM: CPT | Performed by: NURSE PRACTITIONER

## 2021-02-23 PROCEDURE — 84466 ASSAY OF TRANSFERRIN: CPT | Performed by: INTERNAL MEDICINE

## 2021-02-23 PROCEDURE — 82728 ASSAY OF FERRITIN: CPT | Performed by: INTERNAL MEDICINE

## 2021-02-23 PROCEDURE — 80053 COMPREHEN METABOLIC PANEL: CPT | Performed by: INTERNAL MEDICINE

## 2021-02-23 PROCEDURE — 83540 ASSAY OF IRON: CPT | Performed by: INTERNAL MEDICINE

## 2021-02-23 RX ORDER — CYANOCOBALAMIN 1000 UG/ML
1000 INJECTION, SOLUTION INTRAMUSCULAR; SUBCUTANEOUS ONCE
Status: COMPLETED | OUTPATIENT
Start: 2021-02-23 | End: 2021-02-23

## 2021-02-23 RX ORDER — CYANOCOBALAMIN 1000 UG/ML
1000 INJECTION, SOLUTION INTRAMUSCULAR; SUBCUTANEOUS ONCE
OUTPATIENT
Start: 2021-02-25

## 2021-02-23 RX ORDER — CYANOCOBALAMIN 1000 UG/ML
1000 INJECTION, SOLUTION INTRAMUSCULAR; SUBCUTANEOUS ONCE
Status: CANCELLED | OUTPATIENT
Start: 2021-02-25

## 2021-02-23 RX ADMIN — CYANOCOBALAMIN 1000 MCG: 1000 INJECTION, SOLUTION INTRAMUSCULAR; SUBCUTANEOUS at 11:10

## 2021-02-23 NOTE — PROGRESS NOTES
Patient here for week 4 of 4 B12 injections for vitamin B12 deficiency.  Injections will now be monthly from today.  States that she is feeling good today.  No c/o voiced.  Skin w/d to touch.  Color wnl.  Last injection was on 02/04/21.  Will return in one month for same.  Instructed to call with any problems.  Patient v/u.

## 2021-02-24 LAB
FOLATE SERPL-MCNC: 16.8 NG/ML (ref 4.78–24.2)
VIT B12 BLD-MCNC: >2000 PG/ML (ref 211–946)

## 2021-03-01 NOTE — PROGRESS NOTES
MGW ONC [facility] ONCOLOGY  [address] [place] [address] [address]  [facility] [address]  [phone]    Patient Name: Shireen Greer  Encounter Date: 03/08/2021  YOB: 1945  Patient Number: [id number]    REASON FOR VISIT: Shireen Greer is a 75-year-old female who returns in follow-up of anemia with iron and B12 deficiencies.  She was on ferrous sulfate from 05/2017 before it was stopped on 10/16/2017 when she received an initial infusion of INFeD 500 mg and was started on B12 monthly injections beginning 10/04/2017. It has been nearly 13 months since last receipt of IV Injectafer last 02/13/2020.  She is here with her daughter, Torie.      DIAGNOSTIC ABNORMALITIES:  1.Colonoscopy, 06/24/2016.  Diverticular disease. Small rectal internal hemorrhoids. Dr. Villar.  2.EGD, 06/24/2017. Irregular Z-line at EG junction. Biopsies taken to rule out Ortiz's metaplasia. 6 mm long sliding hiatal hernia. Mild gastritis, biopsy taken. Small bowel biopsies taken to rule out celiac disease. Capsule endoscopy recommended as outpatient.  3.Labs 04/24/2017, Dr. Barron: Hemoglobin 9.9, hematocrit 31.1, MCV 81 (81 - 99), MCH 25.8 (27 - 31), MCHC 31.8 (33 - 37), RDW 14.8 (12.3 - 15.1), platelets 272,000, WBC 7.9, normal differential. CMP: Sodium 135 (depressed), glucose 132, BUN 51, creatinine 1.54 (each elevated), EGFR 35.39 (depressed), calcium 8.8, total protein 7.5, total bilirubin 0.3, AST 17, ALT 24, alkaline phos 73 (each normal). Serum iron 31 (50 - 175), TIBC 349 (250 - 450), B12 of 865, ferritin 40.  4.Labs, 06/16/2017: Hemoglobin 10. 5, hematocrit 33.5, MCV 82.4, MCH 25.9, MCHC 31.3 (each depressed), platelets 230,000, WBC 8.0, glucose 127, BUN 40, creatinine 1.4 (each elevated), GFR 39.5 (low),  (normal) iron 49, Fe sat 12.3%, ferritin 43, B12 of 236 (each depressed), folate 7.1, SIMBA negative, SPIEP normal. JULIAN:  No monoclonal immunoglobulins, TSH 3.5, T4 of 9.3 (each  "normal),  5.Stools FOB, negative x3, 06/27/2017.     PREVIOUS INTERVENTIONS:    1.Ferrous sulfate 325 mg p.o. twice daily since 05/2017 (per patient) through 10/16/2017.  2.B12, 1000 mcg monthly beginning 10/04/2017 and 11/06/2017.  3.INFeD 500 mg on 10/16/2017; 12/13/2017; 02/06/2018; 04/11/2018.  4.Venofer 200 mg IV, 11/12, 11/13, 11/14/2018 (600 mg)    ADULT ILLNESSES:    •Microcytic anemia ( ICD-10:D50.9 ;Iron deficiency anemia, unspecified  •Anemia ( ICD-10:D64.9 ;Anemia, unspecified  •Anxiety ( ICD-10:F41.9 ;Anxiety disorder, unspecified  •Arthritis (disorder) ( ICD-10:M19.90 ;Unspecified osteoarthritis, unspecified site  •Chronic kidney disease ( Stage III. GFR 35.39 mL/minute, 04/24/2017; ICD-10:N18.3 ;Chronic kidney disease, stage 3 (moderate) )  •Depression ( ICD-10:F32.9 ;Major depressive disorder, single episode, unspecified  •Diverticulosis ( history of; ICD-10:K57.90 ;Diverticulosis of intestine, part unspecified, without perforation or abscess without bleeding )  •Drug intolerance ( oral iron; ICD-10:T45.4x5D ;Adverse effect of iron and its compounds, subsequent encounter )  •Gastritis (disorder) ( ICD-10:K29.70 ;Gastritis, unspecified, without bleeding  •Hypertension ( ICD-10:I10 ;Essential (primary) hypertension  •Internal hemorrhoids ( ICD-10:K64.8 ;Other hemorrhoids  •Iron deficiency anemia (disorder) ( ICD-10:D50.9 ;Iron deficiency anemia, unspecified  •Malabsorption ( ICD-10:K90.9 ;Intestinal malabsorption, unspecified  •Urinary tract infection ( recurrent; ICD-10:N39.0 ;Urinary tract infection, site not specified )  •Vitamin B12 deficiency ( ICD-10:E53.8 ;Deficiency of other specified B group vitamins    SURGERIES:    •Bilateral cataracts, 11/2019 and 12/2019  •Hernia repair, 2011  •Partial colectomy with hernia repair  •Cystoscopy, 01/30/2018, Dr. Aguilera (urology). Recurrent urinary tract infections. \"All looked good\"  •Total abdominal hysterectomy with bilateral salpingo-oophorectomy, " "2011  •EGD, 2016. \"Hiatal hernia\"  •EGD, 06/24/2017. Irregular Z-line at EG junction. Biopsies taken to rule out Ortiz's metaplasia. A 6 mm long sliding hiatal hernia. Mild gastritis, biopsy taken. Small bowel biopsies taken to rule out celiac disease. Capsule endoscopy recommended as outpatient  •Colonoscopy, 06/24/2016. Diverticular disease. Small rectal internal hemorrhoids. Dr. Villar      Problem List Items Addressed This Visit        Other    Normocytic anemia    Overview     DIAGNOSTIC ABNORMALITIES:  Colonoscopy, 06/24/2016.  Diverticular disease. Small rectal internal hemorrhoids. Dr. Villar.  EGD, 06/24/2017. Irregular Z-line at EG junction. Biopsies taken to rule out Ortiz's metaplasia. 6 mm long sliding hiatal hernia. Mild gastritis, biopsy taken. Small bowel biopsies taken to rule out celiac disease. Capsule endoscopy recommended as outpatient.  Labs 04/24/2017, Dr. Barron: Hemoglobin 9.9, hematocrit 31.1, MCV 81 (81 - 99), MCH 25.8 (27 - 31), MCHC 31.8 (33 - 37), RDW 14.8 (12.3 - 15.1), platelets 272,000, WBC 7.9, normal differential. CMP: Sodium 135 (depressed), glucose 132, BUN 51, creatinine 1.54 (each elevated), EGFR 35.39 (depressed), calcium 8.8, total protein 7.5, total bilirubin 0.3, AST 17, ALT 24, alkaline phos 73 (each normal). Serum iron 31 (50 - 175), TIBC 349 (250 - 450), B12 of 865, ferritin 40.\[id number], 06/16/2017: Hemoglobin 10. 5, hematocrit 33.5, MCV 82.4, MCH 25.9, MCHC 31.3 (each depressed), platelets 230,000, WBC 8.0, glucose 127, BUN 40, creatinine 1.4 (each elevated), GFR 39.5 (low),  (normal) iron 49, Fe sat 12.3%, ferritin 43, B12 of 236 (each depressed), folate 7.1, SIMBA negative, SPIEP normal. JULIAN:  No monoclonal immunoglobulins, TSH 3.5, T4 of 9.3 (each normal),Stools FOB, negative x3, 06/27/2017.    PREVIOUS INTERVENTIONS:  Ferrous sulfate 325 mg p.o. twice daily since 05/2017 (per patient) through 10/16/2017.    B12, 1000 mcg monthly beginning 10/04/2017 and " 11/06/2017.    INFeD 500 mg on 10/16/2017; 12/13/2017; 02/06/2018; 04/11/2018.    Venofer 200 mg IV, 11/12, 11/13, 11/14/2018 (600 mg)         Iron deficiency - Primary    CKD (chronic kidney disease), stage III (CMS/HCC)        Oncology/Hematology History    No history exists.       PAST MEDICAL HISTORY:  ALLERGIES:  No Known Allergies  CURRENT MEDICATIONS:  Outpatient Encounter Medications as of 3/8/2021   Medication Sig Dispense Refill   • calcitriol (ROCALTROL) 0.25 MCG capsule calcitriol 0.25 mcg capsule     • Cholecalciferol (D3-1000) 1000 units tablet D3-2000 2,000 unit capsule   TAKE ONE CAPSULE BY MOUTH ONCE DAILY.     • folic acid (FOLVITE) 1 MG tablet Take 1 tablet by mouth Daily. 30 tablet 3   • furosemide (LASIX) 20 MG tablet Take 20 mg by mouth Daily.     • metoprolol succinate XL (TOPROL-XL) 50 MG 24 hr tablet metoprolol succinate ER 50 mg tablet,extended release 24 hr     • olmesartan (BENICAR) 20 MG tablet olmesartan 20 mg tablet     • oxybutynin XL (DITROPAN-XL) 10 MG 24 hr tablet      • pantoprazole (PROTONIX) 40 MG EC tablet      • vitamin C (ASCORBIC ACID) 250 MG tablet Take 250 mg by mouth Daily.       No facility-administered encounter medications on file as of 3/8/2021.     ADULT ILLNESSES:  Patient Active Problem List   Diagnosis Code   • Normocytic anemia D64.9   • Iron deficiency E61.1   • CKD (chronic kidney disease), stage III (CMS/HCC) N18.30   • B12 deficiency E53.8   • Chronic gastritis K29.50   • Hiatal hernia K44.9   • Hypertension I10   • Anemia in stage 3 chronic kidney disease (CMS/HCC) N18.30, D63.1   • Iron malabsorption K90.9   • Adiposity E66.9   • Calculus of gallbladder K80.20   • Chronic obstructive pulmonary disease (CMS/HCC) J44.9   • Diabetes mellitus (CMS/HCC) E11.9   • Gastroesophageal reflux disease K21.9   • Incisional hernia K43.2   • Left inguinal hernia K40.90     SURGERIES:  Past Surgical History:   Procedure Laterality Date   • CATARACT EXTRACTION, BILATERAL   "11/2019, 12/2019   • COLECTOMY PARTIAL / TOTAL      Partial colectomy with hernia repair   • COLONOSCOPY  06/24/2016    Diverticular disease. Small rectal internal hemorrhoids. Dr. Villar   • CYSTOSCOPY  01/30/2018    Dr. Aguilera (urology). Recurrent urinary tract infections. \"All looked good\"   • ENDOSCOPY  2016    \"Hiatal hernia\"   • ENDOSCOPY  06/24/2017     Irregular Z-line at EG junction. Biopsies taken to rule out Ortiz's metaplasia. A 6 mm long sliding hiatal hernia. Mild gastritis, biopsy taken. Small bowel biopsies taken to rule out celiac disease. Capsule endoscopy recommended as outpatient   • HERNIA REPAIR  2011   • TOTAL ABDOMINAL HYSTERECTOMY WITH SALPINGO OOPHORECTOMY  2011     HEALTH MAINTENANCE ITEMS:  Health Maintenance Due   Topic Date Due   • MAMMOGRAM  Never done   • URINE MICROALBUMIN  Never done   • DXA SCAN  Never done   • COVID-19 Vaccine (1 of 2) Never done   • TDAP/TD VACCINES (1 - Tdap) Never done   • ZOSTER VACCINE (1 of 2) Never done   • Pneumococcal Vaccine 65+ (1 of 1 - PPSV23) Never done   • HEPATITIS C SCREENING  Never done   • ANNUAL WELLNESS VISIT  Never done   • DIABETIC FOOT EXAM  Never done   • HEMOGLOBIN A1C  Never done   • DIABETIC EYE EXAM  Never done   • INFLUENZA VACCINE  Never done       <no information>  Last Completed Colonoscopy       Status Date      COLONOSCOPY Done 6/24/2016 [facility] COLONOSCOPY [facility] Colonoscopy           Patient has more history with this topic...          There is no immunization history on file for this patient.  Last Completed Mammogram       Status Date      MAMMOGRAM No completions recorded            FAMILY HISTORY:  History reviewed. No pertinent family history.  SOCIAL HISTORY:  Social History     Socioeconomic History   • Marital status:      Spouse name: Not on file   • Number of children: Not on file   • Years of education: Not on file   • Highest education level: Not on file   Tobacco Use   • Smoking status: Former Smoker   • Smokeless " "tobacco: Never Used       REVIEW OF SYSTEMS:  Constitutional:  The patient's appetite is fairly good. \"Better since my taste is back.\" Her energy is chronically low.  \"Seems better.\" She manages her personal ADLs and some house chores. Says she is driven around by her children.  Her son passed away so she now lives with her daughter, Meagan.  She has lost 9 lbs since her last visit.  \"I haven't been eating as much.\" She has no fevers, chills, or drenching night sweats. Sleep habits seem appropriate.  Ear/Nose/Throat/Mouth:  No ear pains, sore throat, nosebleeds, or sore tongue. She has no headaches. She denies any hoarseness.   Ocular:  She reports no eye pain, significant change in visual acuity, double vision, or blurry vision.  Improved since cataract surgeries. Is followed by Dr. Magdaleno  Respiratory:  She reports no chronic cough, significant shortness of breathing, phlegm production, or unexplained chest wall pain.  Cardiovascular:  She reports no exertional chest pain, chest pressure, or chest heaviness. She reports no claudication. She reports no palpitations or symptomatic orthostasis.  Gastrointestinal:  She reports no pica, no dysphagia, nausea, vomiting, postprandial abdominal pain, bloating, cramping, or change in bowel habits. No dark (off oral iron) discoloration of the stool. She reports no rectal bleeding, constipation, or diarrhea.  Last EGD and colonoscopy in 2016. \"Hiatal hernia.\" Oral iron intolerant - constipation.  Genitourinary:  She reports recurrent UTIs, saying she was admitted to [facility] last 07/2020 for a week, but none since.  She currently has no urinary burning, frequency, dribbling, or discoloration. She reports difficulty controlling her bladder (cough/sneeze incontinence). Wears adult pads.  She has no need to urinate frequently through the night.  Was seen by Dr. Williamson (urology) for cystoscopy, 01/30/2018.  \"All looked good.\"  Musculoskeletal:  She reports no unexplained " "arthralgias, myalgias, or nighttime leg cramping.  Extremities:  She reports no trouble with fluid retention or significant leg swelling.  Endocrine:  She reports no problems with excess thirst, excessive urination, vasomotor instability, or unexplained fatigue.  Heme/Lymphatic:  She reports no unexplained bleeding, bruising, petechial rashes, or swollen glands.  Skin:  She reports no itching, rashes, or lesions which won't heal.  Neuro:  She uses a walker for support due to gait unsteadiness but reports no loss of consciousness, seizures, fainting spells, or dizziness. She reports no weakness of face, arms, or legs. She has no difficulty with speech. She has no tremors.  She has no paresthesias.  Psych:  She seems generally satisfied with life. She has bouts of depression and anxiety at this time, \"I'm okay most of the time.\"      VITAL SIGNS: /82   Pulse 95   Temp 98.1 °F (36.7 °C)   Resp 16   Ht 162.6 cm (64\")   Wt 114 kg (250 lb 11.2 oz)   SpO2 93%   Breastfeeding No   BMI 43.03 kg/m²  Body surface area is 2.16 meters squared.   Pain Score    03/08/21 1124   PainSc: 0-No pain       PHYSICAL EXAMINATION:  General:   She is a pleasant, cooperative, obese, overly well-developed, well-nourished, and modestly-kept elderly female who is comfortable at rest. She arrived in the exam room ambulatory with a walker. She appears to be her stated age. Her skin color is a bit pale.   Head/Neck:   The patient is anicteric and atraumatic. She is wearing a surgical mask today. The trachea is midline. The neck is supple without evidence of jugular venous distention or cervical adenopathy.   Eyes:   The pupils are equal, round, and reactive to light. The extraocular movements are full. There is no scleral jaundice or erythema.   Chest:   The respiratory efforts are normal and unhindered. The chest is clear to auscultation and percussion. There are no wheezes, rhonchi, rales, or asymmetry of breath " sounds.  Cardiovascular:   The patient has a regular cardiac rate and rhythm without murmurs, rubs, or gallops. The peripheral pulses are equal and full.  Abdomen:   The belly is soft and morbidly obese, again with a firm, nontender ventral hernia. Her habitus precludes an adequate exam. There is no rebound or guarding. There is no overt organomegaly, mass-effect, or tenderness. Bowel sounds are active and of normal character.  Extremities:   There is no evidence of cyanosis, clubbing, with 1+ bipedal edema.  Rheumatologic:   There is no overt evidence of rheumatoid deformities of the hands. There is no sausaging of the fingers. There is no sign of active synovitis. The gait is slow, stooped  and walker supported.  Cutaneous:   There are no overt rashes, disseminated lesions, purpura, or petechiae.   Lymphatics:   There is no evidence of adenopathy in the cervical, supraclavicular, or axillary areas.  Neurologic:   The patient is alert, oriented, cooperative, and pleasant. She is appropriately conversant. She ambulated into the exam room without assistance but declined transfer from chair to exam table. There is no overt dysfunction of the motor, sensory, cerebellar systems.  Psych:   Mood and affect are appropriate for circumstance. Eye contact is appropriate. Normal judgement and decision making.       LABS      Lab Results - Last 18 Months   Lab Units 02/23/21  1119 04/23/20  0911 01/16/20  1014 11/04/19  0946   IRON mcg/dL 45 67 47 56   TIBC mcg/dL 320 292* 322 373   IRON SATURATION % 14* 23 15* 15*   FERRITIN ng/mL 651.00* 826.10* 477.80* 536.60*   FOLATE ng/mL 16.80 11.70  --  12.40     ASSESSMENT:  1.    Normocytic (borderline microcytic) anemia.  Stable, Hgb 11.1; MCV  90.2, 02/23/2021 (prior range:  Hgb 9.7 - 12.5; MCV 81 - 96.6).  Contributions from iron deficiency, iron underutilization (anemia of chronic disease), and chronic kidney disease.    2.    Iron deficiency.  Repleted with ferritin > 600,  "02/23/2021 since last Injectafer.  3.    History of chronic gastritis and hiatal hernia.  4.    Chronic kidney disease, Stage III. Stable, GFR 58 mL/min, 02/23/2021 (prior range:  31.6 - 52 mL/min).  5.    History of diverticulosis. No diverticulitis  6.    Internal hemorrhoids. No recent bleeding  7.    Arthritis. Chronic symptoms  8.    Hypertension. On metoprolol and Benicar  9.    Oral iron failure and intolerance (constipation).  10.  Low B12.  Repleted (>2000) on oral supplements.  11.  Recurrent urinary tract infections (UTIs).  None in the interval since her visit, 12/07/2020  --Was seen by Dr. Williamson (urology) for cystoscopy, 01/30/2018.  \"All looked good.\"  -- Says she was admitted [facility] end of July through 08/07/2020 for COVID symptoms and UTI  12.  Oral iron intolerant - severe constipation      RECOMMENDATIONS:  1.    Apprise of labs from 02/23/2021 with stable (but persistent) anemia otherwise normal CBC, repleted B12 (> 2000), repleted folate (16.8),  repleted (651) ferritin, Fe sat 14% (from 23), depressed (stable) GFR with otherwise essentially normal CMP.  2.    STOP B12 shots and pills (>2000).  3.    Previously noted the labs from 06/16/2017 including the normal SPIEP, low GFR but otherwise stable CMP, negative SIMBA, low B12, normal folate, low serum Fe, normal TIBC, low ferritin, low transferrin saturation, normal LDH/T4 and TSH.  Also noted the negative stools for fecal occult blood (FOB) x3.  4.    The rationale and potential toxicities (including the risk for increased mortality from stroke, myocardial infarction (MI), congestive heart failure (CHF), seizures, sepsis, allergic reactions) for ULISES support previously discussed. Questions answered.  She will agree to proceed with a trial of therapy if and when it becomes indicated.   5.   HOLD - CBC w diff every 4 weeks and HOLD Procrit (GFR > 60) - Fe sat < 20%)  6.    Return to the [place] office in 12 weeks with pre-office (fasting) CMP, " B12, folate, serum iron, Fe sat, ferritin, CBC with differential.  Consider IV iron if iron levels and anemia worsen.    MEDICAL DECISION MAKING: Moderate Complexity  AMOUNT OF DATA: Moderate    I spent 26 total minutes, caring for Shireen today.  100% of this time involved counseling and/or coordination of care as documented within this note regarding the patient's illness(es), pros and cons of various treatment options, instructions and/or risk reduction.    cc:  MD Nadja Burgos, APRN

## 2021-03-08 ENCOUNTER — OFFICE VISIT (OUTPATIENT)
Dept: ONCOLOGY | Facility: CLINIC | Age: 76
End: 2021-03-08

## 2021-03-08 VITALS
RESPIRATION RATE: 16 BRPM | WEIGHT: 250.7 LBS | DIASTOLIC BLOOD PRESSURE: 82 MMHG | TEMPERATURE: 98.1 F | HEIGHT: 64 IN | SYSTOLIC BLOOD PRESSURE: 142 MMHG | OXYGEN SATURATION: 93 % | HEART RATE: 95 BPM | BODY MASS INDEX: 42.8 KG/M2

## 2021-03-08 DIAGNOSIS — E61.1 IRON DEFICIENCY: Primary | ICD-10-CM

## 2021-03-08 DIAGNOSIS — N18.30 STAGE 3 CHRONIC KIDNEY DISEASE, UNSPECIFIED WHETHER STAGE 3A OR 3B CKD (HCC): ICD-10-CM

## 2021-03-08 DIAGNOSIS — D64.9 NORMOCYTIC ANEMIA: ICD-10-CM

## 2021-03-08 PROCEDURE — 99214 OFFICE O/P EST MOD 30 MIN: CPT | Performed by: INTERNAL MEDICINE

## 2021-03-24 ENCOUNTER — TELEPHONE (OUTPATIENT)
Dept: ONCOLOGY | Facility: CLINIC | Age: 76
End: 2021-03-24

## 2021-03-24 NOTE — TELEPHONE ENCOUNTER
Caller: OMAR    Relationship: SELF    Best call back number: [phone]    What was the call regarding:   PT NEEDING CONFIRMATION ON CONTINUING TAKING HER VIT D SUPPLEMENT. PT REMEMBERS TO STOP B12 SHOTS AND PILLS JUST NOT THE VIT D.    Do you require a callback: YES

## 2021-04-15 ENCOUNTER — LAB (OUTPATIENT)
Dept: ONCOLOGY | Facility: CLINIC | Age: 76
End: 2021-04-15

## 2021-04-15 DIAGNOSIS — N18.30 ANEMIA IN STAGE 3 CHRONIC KIDNEY DISEASE, UNSPECIFIED WHETHER STAGE 3A OR 3B CKD (HCC): ICD-10-CM

## 2021-04-15 DIAGNOSIS — D63.1 ANEMIA IN STAGE 3 CHRONIC KIDNEY DISEASE, UNSPECIFIED WHETHER STAGE 3A OR 3B CKD (HCC): ICD-10-CM

## 2021-04-15 DIAGNOSIS — D64.9 NORMOCYTIC ANEMIA: ICD-10-CM

## 2021-04-15 LAB
BASOPHILS # BLD AUTO: 0.01 10*3/MM3 (ref 0–0.2)
BASOPHILS NFR BLD AUTO: 0.1 % (ref 0–1.5)
DEPRECATED RDW RBC AUTO: 43.7 FL (ref 37–54)
EOSINOPHIL # BLD AUTO: 0.23 10*3/MM3 (ref 0–0.4)
EOSINOPHIL NFR BLD AUTO: 2.7 % (ref 0.3–6.2)
ERYTHROCYTE [DISTWIDTH] IN BLOOD BY AUTOMATED COUNT: 12.9 % (ref 12.3–15.4)
HCT VFR BLD AUTO: 36.7 % (ref 34–46.6)
HGB BLD-MCNC: 11.4 G/DL (ref 12–15.9)
IMM GRANULOCYTES # BLD AUTO: 0.01 10*3/MM3 (ref 0–0.05)
IMM GRANULOCYTES NFR BLD AUTO: 0.1 % (ref 0–0.5)
LYMPHOCYTES # BLD AUTO: 1.73 10*3/MM3 (ref 0.7–3.1)
LYMPHOCYTES NFR BLD AUTO: 20.2 % (ref 19.6–45.3)
MCH RBC QN AUTO: 28.1 PG (ref 26.6–33)
MCHC RBC AUTO-ENTMCNC: 31.1 G/DL (ref 31.5–35.7)
MCV RBC AUTO: 90.4 FL (ref 79–97)
MONOCYTES # BLD AUTO: 0.75 10*3/MM3 (ref 0.1–0.9)
MONOCYTES NFR BLD AUTO: 8.7 % (ref 5–12)
NEUTROPHILS NFR BLD AUTO: 5.85 10*3/MM3 (ref 1.7–7)
NEUTROPHILS NFR BLD AUTO: 68.2 % (ref 42.7–76)
PLATELET # BLD AUTO: 225 10*3/MM3 (ref 140–450)
PMV BLD AUTO: 10.1 FL (ref 6–12)
RBC # BLD AUTO: 4.06 10*6/MM3 (ref 3.77–5.28)
WBC # BLD AUTO: 8.58 10*3/MM3 (ref 3.4–10.8)

## 2021-04-15 PROCEDURE — 85025 COMPLETE CBC W/AUTO DIFF WBC: CPT | Performed by: INTERNAL MEDICINE

## 2021-05-10 ENCOUNTER — TELEPHONE (OUTPATIENT)
Dept: ONCOLOGY | Facility: CLINIC | Age: 76
End: 2021-05-10

## 2021-05-13 ENCOUNTER — LAB (OUTPATIENT)
Dept: ONCOLOGY | Facility: CLINIC | Age: 76
End: 2021-05-13

## 2021-05-13 DIAGNOSIS — D63.1 ANEMIA IN STAGE 3 CHRONIC KIDNEY DISEASE, UNSPECIFIED WHETHER STAGE 3A OR 3B CKD (HCC): ICD-10-CM

## 2021-05-13 DIAGNOSIS — D64.9 NORMOCYTIC ANEMIA: ICD-10-CM

## 2021-05-13 DIAGNOSIS — N18.30 ANEMIA IN STAGE 3 CHRONIC KIDNEY DISEASE, UNSPECIFIED WHETHER STAGE 3A OR 3B CKD (HCC): ICD-10-CM

## 2021-05-13 LAB
BASOPHILS # BLD AUTO: 0.02 10*3/MM3 (ref 0–0.2)
BASOPHILS NFR BLD AUTO: 0.2 % (ref 0–1.5)
DEPRECATED RDW RBC AUTO: 43.2 FL (ref 37–54)
EOSINOPHIL # BLD AUTO: 0.25 10*3/MM3 (ref 0–0.4)
EOSINOPHIL NFR BLD AUTO: 2.9 % (ref 0.3–6.2)
ERYTHROCYTE [DISTWIDTH] IN BLOOD BY AUTOMATED COUNT: 12.8 % (ref 12.3–15.4)
HCT VFR BLD AUTO: 37.1 % (ref 34–46.6)
HGB BLD-MCNC: 11.6 G/DL (ref 12–15.9)
IMM GRANULOCYTES # BLD AUTO: 0.03 10*3/MM3 (ref 0–0.05)
IMM GRANULOCYTES NFR BLD AUTO: 0.3 % (ref 0–0.5)
LYMPHOCYTES # BLD AUTO: 1.28 10*3/MM3 (ref 0.7–3.1)
LYMPHOCYTES NFR BLD AUTO: 14.9 % (ref 19.6–45.3)
MCH RBC QN AUTO: 28.2 PG (ref 26.6–33)
MCHC RBC AUTO-ENTMCNC: 31.3 G/DL (ref 31.5–35.7)
MCV RBC AUTO: 90.3 FL (ref 79–97)
MONOCYTES # BLD AUTO: 0.69 10*3/MM3 (ref 0.1–0.9)
MONOCYTES NFR BLD AUTO: 8 % (ref 5–12)
NEUTROPHILS NFR BLD AUTO: 6.34 10*3/MM3 (ref 1.7–7)
NEUTROPHILS NFR BLD AUTO: 73.7 % (ref 42.7–76)
PLATELET # BLD AUTO: 219 10*3/MM3 (ref 140–450)
PMV BLD AUTO: 10.3 FL (ref 6–12)
RBC # BLD AUTO: 4.11 10*6/MM3 (ref 3.77–5.28)
WBC # BLD AUTO: 8.61 10*3/MM3 (ref 3.4–10.8)

## 2021-05-13 PROCEDURE — 85025 COMPLETE CBC W/AUTO DIFF WBC: CPT | Performed by: INTERNAL MEDICINE

## 2021-06-03 ENCOUNTER — LAB (OUTPATIENT)
Dept: ONCOLOGY | Facility: CLINIC | Age: 76
End: 2021-06-03

## 2021-06-03 DIAGNOSIS — E61.1 IRON DEFICIENCY: ICD-10-CM

## 2021-06-03 DIAGNOSIS — D64.9 NORMOCYTIC ANEMIA: ICD-10-CM

## 2021-06-03 DIAGNOSIS — N18.30 STAGE 3 CHRONIC KIDNEY DISEASE, UNSPECIFIED WHETHER STAGE 3A OR 3B CKD (HCC): ICD-10-CM

## 2021-06-03 LAB
ALBUMIN SERPL-MCNC: 3.8 G/DL (ref 3.5–5.2)
ALBUMIN/GLOB SERPL: 0.9 G/DL
ALP SERPL-CCNC: 83 U/L (ref 39–117)
ALT SERPL W P-5'-P-CCNC: 12 U/L (ref 1–33)
ANION GAP SERPL CALCULATED.3IONS-SCNC: 9 MMOL/L (ref 5–15)
AST SERPL-CCNC: 17 U/L (ref 1–32)
BASOPHILS # BLD AUTO: 0 10*3/MM3 (ref 0–0.2)
BASOPHILS NFR BLD AUTO: 0 % (ref 0–1.5)
BILIRUB SERPL-MCNC: 0.2 MG/DL (ref 0–1.2)
BUN SERPL-MCNC: 16 MG/DL (ref 8–23)
BUN/CREAT SERPL: 16.2 (ref 7–25)
CALCIUM SPEC-SCNC: 9.7 MG/DL (ref 8.6–10.5)
CHLORIDE SERPL-SCNC: 100 MMOL/L (ref 98–107)
CO2 SERPL-SCNC: 28 MMOL/L (ref 22–29)
CREAT SERPL-MCNC: 0.99 MG/DL (ref 0.57–1)
DEPRECATED RDW RBC AUTO: 43.9 FL (ref 37–54)
EOSINOPHIL # BLD AUTO: 0.28 10*3/MM3 (ref 0–0.4)
EOSINOPHIL NFR BLD AUTO: 3 % (ref 0.3–6.2)
ERYTHROCYTE [DISTWIDTH] IN BLOOD BY AUTOMATED COUNT: 13.2 % (ref 12.3–15.4)
FERRITIN SERPL-MCNC: 600.7 NG/ML (ref 13–150)
GFR SERPL CREATININE-BSD FRML MDRD: 55 ML/MIN/1.73
GLOBULIN UR ELPH-MCNC: 4.1 GM/DL
GLUCOSE SERPL-MCNC: 127 MG/DL (ref 65–99)
HCT VFR BLD AUTO: 36.8 % (ref 34–46.6)
HGB BLD-MCNC: 11.5 G/DL (ref 12–15.9)
IMM GRANULOCYTES # BLD AUTO: 0.04 10*3/MM3 (ref 0–0.05)
IMM GRANULOCYTES NFR BLD AUTO: 0.4 % (ref 0–0.5)
IRON 24H UR-MRATE: 55 MCG/DL (ref 37–145)
IRON SATN MFR SERPL: 19 % (ref 20–50)
LYMPHOCYTES # BLD AUTO: 2 10*3/MM3 (ref 0.7–3.1)
LYMPHOCYTES NFR BLD AUTO: 21.6 % (ref 19.6–45.3)
MCH RBC QN AUTO: 28 PG (ref 26.6–33)
MCHC RBC AUTO-ENTMCNC: 31.3 G/DL (ref 31.5–35.7)
MCV RBC AUTO: 89.8 FL (ref 79–97)
MONOCYTES # BLD AUTO: 0.7 10*3/MM3 (ref 0.1–0.9)
MONOCYTES NFR BLD AUTO: 7.6 % (ref 5–12)
NEUTROPHILS NFR BLD AUTO: 6.25 10*3/MM3 (ref 1.7–7)
NEUTROPHILS NFR BLD AUTO: 67.4 % (ref 42.7–76)
PLATELET # BLD AUTO: 257 10*3/MM3 (ref 140–450)
PMV BLD AUTO: 10.4 FL (ref 6–12)
POTASSIUM SERPL-SCNC: 4.3 MMOL/L (ref 3.5–5.2)
PROT SERPL-MCNC: 7.9 G/DL (ref 6–8.5)
RBC # BLD AUTO: 4.1 10*6/MM3 (ref 3.77–5.28)
SODIUM SERPL-SCNC: 137 MMOL/L (ref 136–145)
TIBC SERPL-MCNC: 288 MCG/DL (ref 298–536)
TRANSFERRIN SERPL-MCNC: 193 MG/DL (ref 200–360)
WBC # BLD AUTO: 9.27 10*3/MM3 (ref 3.4–10.8)

## 2021-06-03 PROCEDURE — 82728 ASSAY OF FERRITIN: CPT | Performed by: INTERNAL MEDICINE

## 2021-06-03 PROCEDURE — 82607 VITAMIN B-12: CPT | Performed by: INTERNAL MEDICINE

## 2021-06-03 PROCEDURE — 84466 ASSAY OF TRANSFERRIN: CPT | Performed by: INTERNAL MEDICINE

## 2021-06-03 PROCEDURE — 83540 ASSAY OF IRON: CPT | Performed by: INTERNAL MEDICINE

## 2021-06-03 PROCEDURE — 82746 ASSAY OF FOLIC ACID SERUM: CPT | Performed by: INTERNAL MEDICINE

## 2021-06-03 PROCEDURE — 85025 COMPLETE CBC W/AUTO DIFF WBC: CPT | Performed by: INTERNAL MEDICINE

## 2021-06-03 PROCEDURE — 80053 COMPREHEN METABOLIC PANEL: CPT | Performed by: INTERNAL MEDICINE

## 2021-06-04 LAB
FOLATE SERPL-MCNC: >20 NG/ML (ref 4.78–24.2)
VIT B12 BLD-MCNC: 260 PG/ML (ref 211–946)

## 2021-06-04 RX ORDER — CYANOCOBALAMIN/FOLIC AC/VIT B6 1-2.5-25MG
1 TABLET ORAL DAILY
Qty: 30 TABLET | Refills: 0 | Status: SHIPPED | OUTPATIENT
Start: 2021-06-04 | End: 2021-06-29 | Stop reason: SDUPTHER

## 2021-06-04 NOTE — TELEPHONE ENCOUNTER
Notified patient that her vitamin B12 and Folate is down and will need to call in prescription Folbee, to help bring levels up. She v/u of instruction.

## 2021-06-04 NOTE — TELEPHONE ENCOUNTER
----- Message from Vignesh Holman MD sent at 6/4/2021  7:54 AM CDT -----  Pls call in [facility] daily # 30

## 2021-06-06 NOTE — PROGRESS NOTES
[facility] ON[facility] ONCOLOGY  [address] [place] [address] [address]  [facility] [address]  [phone]    Patient Name: Shireen Greer  Encounter Date: 06/10/2021  YOB: 1945  Patient Number: [id number]      REASON FOR VISIT: Shireen Greer is a 75-year-old female who returns in follow-up of anemia with iron and B12 deficiencies.  She was on ferrous sulfate from 05/2017 before it was stopped on 10/16/2017 when she received an initial infusion of INFeD 500 mg and was started on B12 monthly injections beginning 10/04/2017. It has been 16 months since last receipt of IV Injectafer last 02/13/2020.  She is here with her daughter, Torie.      DIAGNOSTIC ABNORMALITIES:  1.Colonoscopy, 06/24/2016.  Diverticular disease. Small rectal internal hemorrhoids. Dr. Villar.  2.EGD, 06/24/2017. Irregular Z-line at EG junction. Biopsies taken to rule out Ortiz's metaplasia. 6 mm long sliding hiatal hernia. Mild gastritis, biopsy taken. Small bowel biopsies taken to rule out celiac disease. Capsule endoscopy recommended as outpatient.  3.Labs 04/24/2017, Dr. Barron: Hemoglobin 9.9, hematocrit 31.1, MCV 81 (81 - 99), MCH 25.8 (27 - 31), MCHC 31.8 (33 - 37), RDW 14.8 (12.3 - 15.1), platelets 272,000, WBC 7.9, normal differential. CMP: Sodium 135 (depressed), glucose 132, BUN 51, creatinine 1.54 (each elevated), EGFR 35.39 (depressed), calcium 8.8, total protein 7.5, total bilirubin 0.3, AST 17, ALT 24, alkaline phos 73 (each normal). Serum iron 31 (50 - 175), TIBC 349 (250 - 450), B12 of 865, ferritin 40.  4.Labs, 06/16/2017: Hemoglobin 10. 5, hematocrit 33.5, MCV 82.4, MCH 25.9, MCHC 31.3 (each depressed), platelets 230,000, WBC 8.0, glucose 127, BUN 40, creatinine 1.4 (each elevated), GFR 39.5 (low),  (normal) iron 49, Fe sat 12.3%, ferritin 43, B12 of 236 (each depressed), folate 7.1, SIMBA negative, SPIEP normal. JULIAN:  No monoclonal immunoglobulins, TSH 3.5, T4 of 9.3 (each  "normal),  5.Stools FOB, negative x3, 06/27/2017.     PREVIOUS INTERVENTIONS:    1.Ferrous sulfate 325 mg p.o. twice daily since 05/2017 (per patient) through 10/16/2017.  2.B12, 1000 mcg monthly beginning 10/04/2017 and 11/06/2017.  3.INFeD 500 mg on 10/16/2017; 12/13/2017; 02/06/2018; 04/11/2018.  4.Venofer 200 mg IV, 11/12, 11/13, 11/14/2018 (600 mg)    ADULT ILLNESSES:    •Microcytic anemia ( ICD-10:D50.9 ;Iron deficiency anemia, unspecified  •Anemia ( ICD-10:D64.9 ;Anemia, unspecified  •Anxiety ( ICD-10:F41.9 ;Anxiety disorder, unspecified  •Arthritis (disorder) ( ICD-10:M19.90 ;Unspecified osteoarthritis, unspecified site  •Chronic kidney disease ( Stage III. GFR 35.39 mL/minute, 04/24/2017; ICD-10:N18.3 ;Chronic kidney disease, stage 3 (moderate) )  •Depression ( ICD-10:F32.9 ;Major depressive disorder, single episode, unspecified  •Diverticulosis ( history of; ICD-10:K57.90 ;Diverticulosis of intestine, part unspecified, without perforation or abscess without bleeding )  •Drug intolerance ( oral iron; ICD-10:T45.4x5D ;Adverse effect of iron and its compounds, subsequent encounter )  •Gastritis (disorder) ( ICD-10:K29.70 ;Gastritis, unspecified, without bleeding  •Hypertension ( ICD-10:I10 ;Essential (primary) hypertension  •Internal hemorrhoids ( ICD-10:K64.8 ;Other hemorrhoids  •Iron deficiency anemia (disorder) ( ICD-10:D50.9 ;Iron deficiency anemia, unspecified  •Malabsorption ( ICD-10:K90.9 ;Intestinal malabsorption, unspecified  •Urinary tract infection ( recurrent; ICD-10:N39.0 ;Urinary tract infection, site not specified )  •Vitamin B12 deficiency ( ICD-10:E53.8 ;Deficiency of other specified B group vitamins    SURGERIES:    •Bilateral cataracts, 11/2019 and 12/2019  •Hernia repair, 2011  •Partial colectomy with hernia repair  •Cystoscopy, 01/30/2018, Dr. Aguilera (urology). Recurrent urinary tract infections. \"All looked good\"  •Total abdominal hysterectomy with bilateral salpingo-oophorectomy, " "2011  •EGD, 2016. \"Hiatal hernia\"  •EGD, 06/24/2017. Irregular Z-line at EG junction. Biopsies taken to rule out Ortiz's metaplasia. A 6 mm long sliding hiatal hernia. Mild gastritis, biopsy taken. Small bowel biopsies taken to rule out celiac disease. Capsule endoscopy recommended as outpatient  •Colonoscopy, 06/24/2016. Diverticular disease. Small rectal internal hemorrhoids. Dr. Villar      Problem List Items Addressed This Visit        Other    Anemia in stage 3 chronic kidney disease (CMS/HCC) - Primary    Relevant Orders    CBC & Differential    Comprehensive Metabolic Panel    Vitamin B12 & Folate    Ferritin    Iron Profile        Oncology/Hematology History    No history exists.       PAST MEDICAL HISTORY:  ALLERGIES:  No Known Allergies  CURRENT MEDICATIONS:  Outpatient Encounter Medications as of 6/10/2021   Medication Sig Dispense Refill   • calcitriol (ROCALTROL) 0.25 MCG capsule calcitriol 0.25 mcg capsule     • Cholecalciferol (D3-1000) 1000 units tablet D3-2000 2,000 unit capsule   TAKE ONE CAPSULE BY MOUTH ONCE DAILY.     • folic acid-vit B6-vit B12 (Folbee) 2.5-25-1 MG tablet tablet Take 1 tablet by mouth Daily. 30 tablet 0   • furosemide (LASIX) 20 MG tablet Take 20 mg by mouth Daily.     • metoprolol succinate XL (TOPROL-XL) 50 MG 24 hr tablet metoprolol succinate ER 50 mg tablet,extended release 24 hr     • olmesartan (BENICAR) 20 MG tablet olmesartan 20 mg tablet     • oxybutynin XL (DITROPAN-XL) 10 MG 24 hr tablet      • pantoprazole (PROTONIX) 40 MG EC tablet      • vitamin C (ASCORBIC ACID) 250 MG tablet Take 250 mg by mouth Daily.       No facility-administered encounter medications on file as of 6/10/2021.     ADULT ILLNESSES:  Patient Active Problem List   Diagnosis Code   • Normocytic anemia D64.9   • Iron deficiency E61.1   • CKD (chronic kidney disease), stage III (CMS/HCC) N18.30   • B12 deficiency E53.8   • Chronic gastritis K29.50   • Hiatal hernia K44.9   • Hypertension I10   • " "Anemia in stage 3 chronic kidney disease (CMS/[facility]) N18.30, D63.1   • Iron malabsorption K90.9   • Adiposity E66.9   • Calculus of gallbladder K80.20   • Chronic obstructive pulmonary disease (CMS/HCC) J44.9   • Diabetes mellitus (CMS/[facility]) E11.9   • Gastroesophageal reflux disease K21.9   • Incisional hernia K43.2   • Left inguinal hernia K40.90     SURGERIES:  Past Surgical History:   Procedure Laterality Date   • CATARACT EXTRACTION, BILATERAL  11/2019, 12/2019   • COLECTOMY PARTIAL / TOTAL      Partial colectomy with hernia repair   • COLONOSCOPY  06/24/2016    Diverticular disease. Small rectal internal hemorrhoids. Dr. Villar   • CYSTOSCOPY  01/30/2018    Dr. Aguilera (urology). Recurrent urinary tract infections. \"All looked good\"   • ENDOSCOPY  2016    \"Hiatal hernia\"   • ENDOSCOPY  06/24/2017     Irregular Z-line at EG junction. Biopsies taken to rule out Ortiz's metaplasia. A 6 mm long sliding hiatal hernia. Mild gastritis, biopsy taken. Small bowel biopsies taken to rule out celiac disease. Capsule endoscopy recommended as outpatient   • HERNIA REPAIR  2011   • TOTAL ABDOMINAL HYSTERECTOMY WITH SALPINGO OOPHORECTOMY  2011     HEALTH MAINTENANCE ITEMS:  Health Maintenance Due   Topic Date Due   • MAMMOGRAM  Never done   • URINE MICROALBUMIN  Never done   • DXA SCAN  Never done   • COVID-19 Vaccine (1) Never done   • TDAP/TD VACCINES (1 - Tdap) Never done   • ZOSTER VACCINE (1 of 2) Never done   • Pneumococcal Vaccine 65+ (1 of 1 - PPSV23) Never done   • HEPATITIS C SCREENING  Never done   • ANNUAL WELLNESS VISIT  Never done   • DIABETIC FOOT EXAM  Never done   • HEMOGLOBIN A1C  Never done       <no information>  Last Completed Colonoscopy     This patient has no relevant Health Maintenance data.          There is no immunization history on file for this patient.  Last Completed Mammogram     This patient has no relevant Health Maintenance data.            FAMILY HISTORY:  History reviewed. No pertinent family " "history.  SOCIAL HISTORY:  Social History     Socioeconomic History   • Marital status:      Spouse name: Not on file   • Number of children: Not on file   • Years of education: Not on file   • Highest education level: Not on file   Tobacco Use   • Smoking status: Former Smoker   • Smokeless tobacco: Never Used       REVIEW OF SYSTEMS:  Constitutional:  The patient's appetite is fairly good. \"Good.\" Her energy is chronically low to fair.  \"Better.\" She manages her personal ADLs and some house chores. Says she is driven around by her children.  Her son previously passed away so she now lives with her daughter, Meagan.  She has regained 3 pounds (had lost 9 lbs at her prior visit) since her last visit. She has no fevers, chills, or drenching night sweats. Sleep habits seem appropriate.  Ear/Nose/Throat/Mouth:  No ear pains, sore throat, nosebleeds, or sore tongue. She has no headaches. She denies any hoarseness.   Ocular:  She reports no eye pain, significant change in visual acuity, double vision, or blurry vision.  Improved since cataract surgeries. Is followed by Dr. Magdaleno  Respiratory:  She reports no chronic cough, significant shortness of breathing, phlegm production, or unexplained chest wall pain.  Cardiovascular:  She reports no exertional chest pain, chest pressure, or chest heaviness. She reports no claudication. She reports no palpitations or symptomatic orthostasis.  Gastrointestinal:  She reports no pica, no dysphagia, nausea, vomiting, postprandial abdominal pain, bloating, cramping, or change in bowel habits. No dark (off oral iron) discoloration of the stool. She reports no rectal bleeding, constipation, or diarrhea.  Last EGD and colonoscopy in 2016. \"Hiatal hernia.\" Oral iron intolerant - constipation.  Genitourinary:  She currently has no urinary burning, frequency, dribbling, or discoloration. She reports difficulty controlling her bladder (cough/sneeze incontinence). Wears adult pads.  " "She has no need to urinate frequently through the night.  Was seen by Dr. Williamson (urology) for cystoscopy, 01/30/2018.  \"All looked good.\"  Musculoskeletal:  She reports no unexplained arthralgias, myalgias, or nighttime leg cramping.  Extremities:  She reports no trouble with fluid retention or significant leg swelling.  Endocrine:  She reports no problems with excess thirst, excessive urination, vasomotor instability, or unexplained fatigue.  Heme/Lymphatic:  She reports no unexplained bleeding, bruising, petechial rashes, or swollen glands.  Skin:  She reports no itching, rashes, or lesions which won't heal.  Neuro:  She uses a walker for support due to gait unsteadiness but reports no loss of consciousness, seizures, fainting spells, or dizziness. She reports no weakness of face, arms, or legs. She has no difficulty with speech. She has no tremors.  She has no paresthesias.  Psych:  She seems generally satisfied with life. She has not had bouts of depression nor anxiety, \"I've been ok.\"      VITAL SIGNS: /82   Pulse 74   Temp 96.5 °F (35.8 °C)   Resp 16   Ht 162.6 cm (64\")   Wt 115 kg (253 lb 14.4 oz)   SpO2 96%   Breastfeeding No   BMI 43.58 kg/m²  Body surface area is 2.16 meters squared.   Pain Score    06/10/21 1025   PainSc: 0-No pain       PHYSICAL EXAMINATION:  General:   She is a pleasant, cooperative, morbidly obese and modestly-kept elderly female who is comfortable at rest. She arrived in the exam room ambulatory with a walker. She appears to be her stated age. Her skin color is a bit pale.   Head/Neck:   The patient is anicteric and atraumatic. She is wearing a surgical mask today. The trachea is midline. The neck is supple without evidence of jugular venous distention or cervical adenopathy.   Eyes:   The pupils are equal, round, and reactive to light. The extraocular movements are full. There is no scleral jaundice or erythema.   Chest:   The respiratory efforts are normal and " unhindered. The chest is clear to auscultation and percussion. There are no wheezes, rhonchi, rales, or asymmetry of breath sounds.  Cardiovascular:   The patient has a regular cardiac rate and rhythm without murmurs, rubs, or gallops.   Abdomen:   The belly is soft and morbidly obese, again with a firm, nontender ventral hernia and large panniculus. Her habitus precludes an adequate exam. There is no rebound or guarding. There is no overt organomegaly, mass-effect, or tenderness. Bowel sounds are active and of normal character.  Extremities:   There is no evidence of cyanosis, clubbing, with 1+ bipedal edema.  Rheumatologic:   There is no overt evidence of rheumatoid deformities of the hands. There is no sausaging of the fingers. There is no sign of active synovitis. The gait is slow, stooped  and walker supported.  Cutaneous:   There are no overt rashes, disseminated lesions, purpura, or petechiae.   Lymphatics:   There is no evidence of adenopathy in the cervical, supraclavicular, or axillary areas.  Neurologic:   The patient is alert, oriented, cooperative, and pleasant. She is appropriately conversant. She ambulated into the exam room without assistance but declined transfer from chair to exam table. There is no overt dysfunction of the motor, sensory, cerebellar systems.  Psych:   Mood and affect are appropriate for circumstance. Eye contact is appropriate. Normal judgement and decision making.                                                              ASSESSMENT:  1.    Normocytic (borderline microcytic) anemia.    --Stable, Hgb 11.5; MCV  89.8, 06/03/2021 (prior range:  Hgb 9.7 - 12.5; MCV 81 - 96.6).  Contributions from iron deficiency, iron underutilization (anemia of chronic disease), and chronic kidney disease.    2.    Iron deficiency.  Repleted with ferritin > 600, 02/23/2021 since last Injectafer.  3.    History of chronic gastritis and hiatal hernia.  4.    Chronic kidney disease, Stage III.  "  --Stable, GFR 55 mL/min, 06/03/2021 (prior range:  31.6 - 52 mL/min).  5.    History of diverticulosis. No diverticulitis  6.    Internal hemorrhoids. No recent bleeding  7.    Arthritis. Chronic symptoms  8.    Hypertension. On metoprolol and Benicar  9.    Oral iron failure and intolerance (constipation).  10.  Low B12.  Again on oral supplements.  11.  Recurrent urinary tract infections (UTIs).  None in the interval since her visit, 12/07/2020  --Was seen by Dr. Williamson (urology) for cystoscopy, 01/30/2018.  \"All looked good.\"  -- [place] [facility] [place] for COVID symptoms and UTI  12.  Oral iron intolerant - severe constipation      RECOMMENDATIONS:  1.    Apprise of labs from 06/03/2021 with stable (but persistent) anemia otherwise normal CBC,  repleted (600) ferritin, Fe sat 19% (from 14%), repleted folate, depressed (260) B12 (Folbee called in), depressed (stable) GFR with otherwise essentially normal CMP.  2.    Rx:  Folbee po daily # 30  3.    Previously noted the labs from 06/16/2017 including the normal SPIEP, low GFR but otherwise stable CMP, negative SIMBA, low B12, normal folate, low serum Fe, normal TIBC, low ferritin, low transferrin saturation, normal LDH/T4 and TSH.  Also noted the negative stools for fecal occult blood (FOB) x3.  4.    The rationale and potential toxicities (including the risk for increased mortality from stroke, myocardial infarction (MI), congestive heart failure (CHF), seizures, sepsis, allergic reactions) for ULISES support previously discussed. Questions answered.  She will agree to proceed with a trial of therapy if and when it becomes indicated.   5.   HOLD - CBC w diff every 4 weeks and HOLD Procrit (GFR > 60) - Fe sat < 20%)  6.    Return to the [place] office in 16 weeks with pre-office (fasting) CMP, B12, folate, serum iron, Fe sat, ferritin, CBC with differential.  Consider IV iron if iron levels and anemia worsen.    MEDICAL DECISION " MAKING: Moderate Complexity  AMOUNT OF DATA: Moderate    I spent 30 total minutes, caring for Shireen today.  100% of this time involved counseling and/or coordination of care as documented within this note regarding the patient's illness(es), pros and cons of various treatment options, instructions and/or risk reduction.    cc:  MD Nadja Burgos, APRN

## 2021-06-10 ENCOUNTER — OFFICE VISIT (OUTPATIENT)
Dept: ONCOLOGY | Facility: CLINIC | Age: 76
End: 2021-06-10

## 2021-06-10 VITALS
DIASTOLIC BLOOD PRESSURE: 82 MMHG | OXYGEN SATURATION: 96 % | RESPIRATION RATE: 16 BRPM | WEIGHT: 253.9 LBS | SYSTOLIC BLOOD PRESSURE: 132 MMHG | TEMPERATURE: 96.5 F | HEIGHT: 64 IN | BODY MASS INDEX: 43.35 KG/M2 | HEART RATE: 74 BPM

## 2021-06-10 DIAGNOSIS — D63.1 ANEMIA IN STAGE 3 CHRONIC KIDNEY DISEASE, UNSPECIFIED WHETHER STAGE 3A OR 3B CKD (HCC): Primary | ICD-10-CM

## 2021-06-10 DIAGNOSIS — N18.30 ANEMIA IN STAGE 3 CHRONIC KIDNEY DISEASE, UNSPECIFIED WHETHER STAGE 3A OR 3B CKD (HCC): Primary | ICD-10-CM

## 2021-06-10 PROCEDURE — 99214 OFFICE O/P EST MOD 30 MIN: CPT | Performed by: INTERNAL MEDICINE

## 2021-06-29 ENCOUNTER — TELEPHONE (OUTPATIENT)
Dept: ONCOLOGY | Facility: CLINIC | Age: 76
End: 2021-06-29

## 2021-06-29 RX ORDER — CYANOCOBALAMIN/FOLIC AC/VIT B6 1-2.5-25MG
1 TABLET ORAL DAILY
Qty: 30 TABLET | Refills: 0 | Status: SHIPPED | OUTPATIENT
Start: 2021-06-29 | End: 2021-10-20 | Stop reason: SDUPTHER

## 2021-06-29 NOTE — TELEPHONE ENCOUNTER
Caller: Shireen Greer    Relationship: Self    Best call back number: [phone]    Medication needed:   Requested Prescriptions     Pending Prescriptions Disp Refills   • folic acid-vit B6-vit B12 (Folbee) 2.5-25-1 MG tablet tablet 30 tablet 0     Sig: Take 1 tablet by mouth Daily.       Does the patient have less than a 3 day supply:  [] Yes  [x] No    What is the patient's preferred pharmacy: [facility] - [address] [phone] [facility] [phone]

## 2021-07-22 ENCOUNTER — TELEPHONE (OUTPATIENT)
Dept: ONCOLOGY | Facility: CLINIC | Age: 76
End: 2021-07-22

## 2021-07-22 NOTE — TELEPHONE ENCOUNTER
Caller: Shireen Gerer    Relationship: Self    Best call back number: [phone] (H)    What form or medical record are you requesting: COPY OF UPDATED INS    Who is requesting this form or medical record from you: DR. BONILLA    How would you like to receive the form or medical records (pick-up, mail, fax): FAX  If fax, what is the fax number: [phone]

## 2021-07-23 ENCOUNTER — TELEPHONE (OUTPATIENT)
Dept: ENT CLINIC | Age: 76
End: 2021-07-23

## 2021-07-23 NOTE — TELEPHONE ENCOUNTER
Called and spoke to patient's daughter. I let her know we haven't received a copy of patient's updated insurance yet to send. She said it was okay and she'll take care of it for patient.

## 2021-07-29 ENCOUNTER — TELEPHONE (OUTPATIENT)
Dept: ENT CLINIC | Age: 76
End: 2021-07-29

## 2021-08-19 DIAGNOSIS — D64.9 NORMOCYTIC ANEMIA: Primary | ICD-10-CM

## 2021-08-26 ENCOUNTER — LAB (OUTPATIENT)
Dept: ONCOLOGY | Facility: CLINIC | Age: 76
End: 2021-08-26

## 2021-08-26 DIAGNOSIS — D63.1 ANEMIA IN STAGE 3 CHRONIC KIDNEY DISEASE, UNSPECIFIED WHETHER STAGE 3A OR 3B CKD (HCC): ICD-10-CM

## 2021-08-26 DIAGNOSIS — N18.30 ANEMIA IN STAGE 3 CHRONIC KIDNEY DISEASE, UNSPECIFIED WHETHER STAGE 3A OR 3B CKD (HCC): ICD-10-CM

## 2021-08-26 PROCEDURE — 82607 VITAMIN B-12: CPT | Performed by: INTERNAL MEDICINE

## 2021-08-26 PROCEDURE — 82746 ASSAY OF FOLIC ACID SERUM: CPT | Performed by: INTERNAL MEDICINE

## 2021-08-27 LAB
FOLATE SERPL-MCNC: 17.5 NG/ML (ref 4.78–24.2)
VIT B12 BLD-MCNC: 429 PG/ML (ref 211–946)

## 2021-08-31 ENCOUNTER — TELEPHONE (OUTPATIENT)
Dept: ONCOLOGY | Facility: CLINIC | Age: 76
End: 2021-08-31

## 2021-08-31 NOTE — TELEPHONE ENCOUNTER
Caller: OMAR    Relationship to patient: SELF    Best call back number: [phone]    Patient is needing: LAB RESULTS FROM LAST Thursday.

## 2021-10-19 ENCOUNTER — LAB (OUTPATIENT)
Dept: ONCOLOGY | Facility: CLINIC | Age: 76
End: 2021-10-19

## 2021-10-19 DIAGNOSIS — N18.30 ANEMIA IN STAGE 3 CHRONIC KIDNEY DISEASE, UNSPECIFIED WHETHER STAGE 3A OR 3B CKD (HCC): ICD-10-CM

## 2021-10-19 DIAGNOSIS — D63.1 ANEMIA IN STAGE 3 CHRONIC KIDNEY DISEASE, UNSPECIFIED WHETHER STAGE 3A OR 3B CKD (HCC): ICD-10-CM

## 2021-10-19 LAB
ALBUMIN SERPL-MCNC: 3.8 G/DL (ref 3.5–5.2)
ALBUMIN/GLOB SERPL: 1.2 G/DL
ALP SERPL-CCNC: 91 U/L (ref 39–117)
ALT SERPL W P-5'-P-CCNC: 10 U/L (ref 1–33)
ANION GAP SERPL CALCULATED.3IONS-SCNC: 10 MMOL/L (ref 5–15)
AST SERPL-CCNC: 14 U/L (ref 1–32)
BASOPHILS # BLD AUTO: 0.01 10*3/MM3 (ref 0–0.2)
BASOPHILS NFR BLD AUTO: 0.1 % (ref 0–1.5)
BILIRUB SERPL-MCNC: 0.3 MG/DL (ref 0–1.2)
BUN SERPL-MCNC: 15 MG/DL (ref 8–23)
BUN/CREAT SERPL: 18.5 (ref 7–25)
CALCIUM SPEC-SCNC: 8.8 MG/DL (ref 8.6–10.5)
CHLORIDE SERPL-SCNC: 101 MMOL/L (ref 98–107)
CO2 SERPL-SCNC: 26 MMOL/L (ref 22–29)
CREAT SERPL-MCNC: 0.81 MG/DL (ref 0.57–1)
DEPRECATED RDW RBC AUTO: 44 FL (ref 37–54)
EOSINOPHIL # BLD AUTO: 0.31 10*3/MM3 (ref 0–0.4)
EOSINOPHIL NFR BLD AUTO: 4.1 % (ref 0.3–6.2)
ERYTHROCYTE [DISTWIDTH] IN BLOOD BY AUTOMATED COUNT: 13.4 % (ref 12.3–15.4)
FERRITIN SERPL-MCNC: 566.5 NG/ML (ref 13–150)
GFR SERPL CREATININE-BSD FRML MDRD: 69 ML/MIN/1.73
GLOBULIN UR ELPH-MCNC: 3.3 GM/DL
GLUCOSE SERPL-MCNC: 174 MG/DL (ref 65–99)
HCT VFR BLD AUTO: 34.7 % (ref 34–46.6)
HGB BLD-MCNC: 10.8 G/DL (ref 12–15.9)
IMM GRANULOCYTES # BLD AUTO: 0.02 10*3/MM3 (ref 0–0.05)
IMM GRANULOCYTES NFR BLD AUTO: 0.3 % (ref 0–0.5)
IRON 24H UR-MRATE: 43 MCG/DL (ref 37–145)
IRON SATN MFR SERPL: 14 % (ref 20–50)
LYMPHOCYTES # BLD AUTO: 1.13 10*3/MM3 (ref 0.7–3.1)
LYMPHOCYTES NFR BLD AUTO: 15.1 % (ref 19.6–45.3)
MCH RBC QN AUTO: 27.5 PG (ref 26.6–33)
MCHC RBC AUTO-ENTMCNC: 31.1 G/DL (ref 31.5–35.7)
MCV RBC AUTO: 88.3 FL (ref 79–97)
MONOCYTES # BLD AUTO: 0.65 10*3/MM3 (ref 0.1–0.9)
MONOCYTES NFR BLD AUTO: 8.7 % (ref 5–12)
NEUTROPHILS NFR BLD AUTO: 5.37 10*3/MM3 (ref 1.7–7)
NEUTROPHILS NFR BLD AUTO: 71.7 % (ref 42.7–76)
PLATELET # BLD AUTO: 224 10*3/MM3 (ref 140–450)
PMV BLD AUTO: 10.2 FL (ref 6–12)
POTASSIUM SERPL-SCNC: 4 MMOL/L (ref 3.5–5.2)
PROT SERPL-MCNC: 7.1 G/DL (ref 6–8.5)
RBC # BLD AUTO: 3.93 10*6/MM3 (ref 3.77–5.28)
SODIUM SERPL-SCNC: 137 MMOL/L (ref 136–145)
TIBC SERPL-MCNC: 299 MCG/DL (ref 298–536)
TRANSFERRIN SERPL-MCNC: 201 MG/DL (ref 200–360)
WBC # BLD AUTO: 7.49 10*3/MM3 (ref 3.4–10.8)

## 2021-10-19 PROCEDURE — 82728 ASSAY OF FERRITIN: CPT | Performed by: INTERNAL MEDICINE

## 2021-10-19 PROCEDURE — 82607 VITAMIN B-12: CPT | Performed by: INTERNAL MEDICINE

## 2021-10-19 PROCEDURE — 83540 ASSAY OF IRON: CPT | Performed by: INTERNAL MEDICINE

## 2021-10-19 PROCEDURE — 82746 ASSAY OF FOLIC ACID SERUM: CPT | Performed by: INTERNAL MEDICINE

## 2021-10-19 PROCEDURE — 85025 COMPLETE CBC W/AUTO DIFF WBC: CPT | Performed by: INTERNAL MEDICINE

## 2021-10-19 PROCEDURE — 80053 COMPREHEN METABOLIC PANEL: CPT | Performed by: INTERNAL MEDICINE

## 2021-10-19 PROCEDURE — 84466 ASSAY OF TRANSFERRIN: CPT | Performed by: INTERNAL MEDICINE

## 2021-10-20 ENCOUNTER — TELEPHONE (OUTPATIENT)
Dept: ONCOLOGY | Facility: CLINIC | Age: 76
End: 2021-10-20

## 2021-10-20 DIAGNOSIS — E53.8 LOW VITAMIN B12 LEVEL: Primary | ICD-10-CM

## 2021-10-20 LAB
FOLATE SERPL-MCNC: 9.16 NG/ML (ref 4.78–24.2)
VIT B12 BLD-MCNC: 279 PG/ML (ref 211–946)

## 2021-10-20 RX ORDER — CYANOCOBALAMIN/FOLIC AC/VIT B6 1-2.5-25MG
1 TABLET ORAL DAILY
Qty: 30 TABLET | Refills: 0 | Status: SHIPPED | OUTPATIENT
Start: 2021-10-20 | End: 2022-06-15 | Stop reason: SDUPTHER

## 2021-10-20 NOTE — TELEPHONE ENCOUNTER
Left patient a detailed vcm that Dr. Holman would like to call her in folbee daily #30. This is for her Vitamin B12& Folate. Script will be sent to Dr. Holman to sign off on.     RP

## 2021-11-29 ENCOUNTER — TELEPHONE (OUTPATIENT)
Dept: ONCOLOGY | Facility: CLINIC | Age: 76
End: 2021-11-29

## 2021-11-29 NOTE — TELEPHONE ENCOUNTER
Caller: MAGDALENO LO    Relationship to patient: DAUGHTER    Best call back number: [phone]    Chief complaint: PATIENT HAS BEEN ILL AND MISSED LAST APPT. WOULD LIKE TO RESCHEDULE    Type of visit:  FOLLOW UP    Requested date: REQUESTED PHONE VISIT. OR IN PERSON IN THE MORNINGS    If rescheduling, when is the original appointment: 11/1/21    Additional notes: PLEASE CALL TO RESCHEDULE

## 2021-12-09 ENCOUNTER — TELEPHONE (OUTPATIENT)
Dept: ONCOLOGY | Facility: CLINIC | Age: 76
End: 2021-12-09

## 2021-12-09 NOTE — TELEPHONE ENCOUNTER
PTS DAUGHTER CALLED, SAID SHE WOULD LIKE TO R/S PTS APPT FOR TOMORROW 12/10. PTS DAUGHTER IS SICK AND SHE IS THE ONE WHO WOULD BRINGING THE PT TO HER APPT. W/T TO OFFICE TO R/S APPT.

## 2021-12-22 ENCOUNTER — TELEPHONE (OUTPATIENT)
Dept: ONCOLOGY | Facility: CLINIC | Age: 76
End: 2021-12-22

## 2021-12-22 NOTE — TELEPHONE ENCOUNTER
Caller: Shireen Greer    Relationship: Self    Best call back number: [phone]    What was the call regarding: SHIREEN CALLED TO SEE IF SHE COULD GET HER APPT TOMORROW SWITCHED TO A TELEPHONE VISIT. SHE SAYS THEY ARE HAVING A HARD TIME GETTING OUT.    Do you require a callback: YES

## 2022-01-17 ENCOUNTER — TELEPHONE (OUTPATIENT)
Dept: ONCOLOGY | Facility: CLINIC | Age: 77
End: 2022-01-17

## 2022-01-17 NOTE — TELEPHONE ENCOUNTER
Caller: OMAR    Relationship to patient: SELF    Best call back number: [phone]    Patient is needing: TO R/S 1- F/U APPT. PT NEEDS A MORNING APPT ON 1-, IF POSSIBLE.

## 2022-02-16 ENCOUNTER — TELEPHONE (OUTPATIENT)
Dept: ONCOLOGY | Facility: CLINIC | Age: 77
End: 2022-02-16

## 2022-02-16 DIAGNOSIS — E53.8 LOW VITAMIN B12 LEVEL: ICD-10-CM

## 2022-02-16 DIAGNOSIS — N18.30 ANEMIA IN STAGE 3 CHRONIC KIDNEY DISEASE, UNSPECIFIED WHETHER STAGE 3A OR 3B CKD: Primary | ICD-10-CM

## 2022-02-16 DIAGNOSIS — D63.1 ANEMIA IN STAGE 3 CHRONIC KIDNEY DISEASE, UNSPECIFIED WHETHER STAGE 3A OR 3B CKD: Primary | ICD-10-CM

## 2022-03-09 ENCOUNTER — TELEPHONE (OUTPATIENT)
Dept: ONCOLOGY | Facility: CLINIC | Age: 77
End: 2022-03-09

## 2022-03-09 NOTE — TELEPHONE ENCOUNTER
Caller: Shireen Greer    Relationship: Self    Best call back number: [phone]    Who are you requesting to speak with (clinical staff, provider,  specific staff member): CLINICAL    What was the call regarding: PATIENT CALLING TO VERIFY IF LABS ARE NEEDED FOR YOU 3/18/22 APPT.    IF SO CAN THESE BE COMPLETED IN [place].    Do you require a callback: YES

## 2022-03-18 ENCOUNTER — TELEPHONE (OUTPATIENT)
Dept: ONCOLOGY | Facility: CLINIC | Age: 77
End: 2022-03-18

## 2022-03-18 NOTE — TELEPHONE ENCOUNTER
WARMED TRANSFERRED PATIENT'S DAUGHTER TO  STAFF DUE TO NEEDING TO CANCEL SAME DAY APPT.      [facility]UB

## 2022-03-30 ENCOUNTER — TELEPHONE (OUTPATIENT)
Dept: ONCOLOGY | Facility: CLINIC | Age: 77
End: 2022-03-30

## 2022-03-30 NOTE — TELEPHONE ENCOUNTER
Provider: DR CONCEPCION    Caller: OMAR    Relationship to Patient: SELF    Reason for Call: OMAR WOULD LIKE TO GET RESCHEDULED FOR HER APPT.    PLEASE ADVISE

## 2022-04-26 ENCOUNTER — TELEPHONE (OUTPATIENT)
Dept: ONCOLOGY | Facility: CLINIC | Age: 77
End: 2022-04-26

## 2022-05-27 ENCOUNTER — TELEPHONE (OUTPATIENT)
Dept: ONCOLOGY | Facility: CLINIC | Age: 77
End: 2022-05-27

## 2022-05-27 NOTE — TELEPHONE ENCOUNTER
Caller: Shireen Greer    Relationship: Self    Best call back number: [phone]      What was the call regarding: PATIENT CALLED STATED SHE NEEDED LAB ORDERS SENT TO HER PCP OFFICE DR MARGE BONILLA

## 2022-05-31 ENCOUNTER — TELEPHONE (OUTPATIENT)
Dept: ONCOLOGY | Facility: CLINIC | Age: 77
End: 2022-05-31

## 2022-05-31 NOTE — TELEPHONE ENCOUNTER
Notified patient to contact [facility] to see if they will allow her to have her lab work drawn in their facility and I will fax over the orders, pt v/u

## 2022-05-31 NOTE — TELEPHONE ENCOUNTER
Caller: Shireen Greer    Relationship: Self    Best call back number:  [phone]    What is the best time to reach you: ANYTIME    Who are you requesting to speak with (clinical staff, provider,  specific staff member): DR THOMAS OR NURSE    What was the call regarding: PT CALLING TO SEE IF DR THOMAS ARRANGED FOR HER LAB APPT THIS WEEK AT [facility]ICE - PLEASE CALL ASAP TO ADVISE SINCE SHE NEEDS LABS THE WEEK PRIOR TO APPT AND HER F/U APPT IS 6.8.     Do you require a callback: YES

## 2022-06-06 ENCOUNTER — TELEPHONE (OUTPATIENT)
Dept: ONCOLOGY | Facility: CLINIC | Age: 77
End: 2022-06-06

## 2022-06-06 NOTE — TELEPHONE ENCOUNTER
Caller: Shireen Greer    Relationship to patient: Self    Best call back number: [phone]    Chief complaint: [facility] DUE TO TRANSPORTATION ISSUES    Type of visit: FOLLOW UP 1    Requested date: NEXT WEEK    If rescheduling, when is the original appointment: 6/8/2022

## 2022-06-09 NOTE — PROGRESS NOTES
[facility] ONC [facility] ONCOLOGY  [address] [place] [address] [address]  [facility] [address]  [phone]    Patient Name: Shireen Greer  Encounter Date: 06/15/2022  YOB: 1945  Patient Number: [id number]      REASON FOR VISIT: Shireen Greer is a 76-year-old female who returns in follow-up of anemia with iron and B12 deficiencies.  She was on ferrous sulfate from 05/2017 before it was stopped on 10/16/2017 when she received an initial infusion of INFeD 500 mg and was started on B12 monthly injections beginning 10/04/2017. It has been 28 months since last receipt of IV Injectafer last 02/13/2020.  She is here with her daughter, Meagan (previously with another daughter, Torie).    I have reviewed the HPI and verified with the patient the accuracy of it. No changes to interval history since the information was documented. Vignesh Holman MD 06/15/22     DIAGNOSTIC ABNORMALITIES:  1.Colonoscopy, 06/24/2016.  Diverticular disease. Small rectal internal hemorrhoids. Dr. Villar.  2.EGD, 06/24/2017. Irregular Z-line at EG junction. Biopsies taken to rule out Ortiz's metaplasia. 6 mm long sliding hiatal hernia. Mild gastritis, biopsy taken. Small bowel biopsies taken to rule out celiac disease. Capsule endoscopy recommended as outpatient.  3.Labs 04/24/2017, Dr. Barron: Hemoglobin 9.9, hematocrit 31.1, MCV 81 (81 - 99), MCH 25.8 (27 - 31), MCHC 31.8 (33 - 37), RDW 14.8 (12.3 - 15.1), platelets 272,000, WBC 7.9, normal differential. CMP: Sodium 135 (depressed), glucose 132, BUN 51, creatinine 1.54 (each elevated), EGFR 35.39 (depressed), calcium 8.8, total protein 7.5, total bilirubin 0.3, AST 17, ALT 24, alkaline phos 73 (each normal). Serum iron 31 (50 - 175), TIBC 349 (250 - 450), B12 of 865, ferritin 40.  4.Labs, 06/16/2017: Hemoglobin 10. 5, hematocrit 33.5, MCV 82.4, MCH 25.9, MCHC 31.3 (each depressed), platelets 230,000, WBC 8.0, glucose 127, BUN 40, creatinine 1.4 (each  elevated), GFR 39.5 (low),  (normal) iron 49, Fe sat 12.3%, ferritin 43, B12 of 236 (each depressed), folate 7.1, SIMBA negative, SPIEP normal. JULIAN:  No monoclonal immunoglobulins, TSH 3.5, T4 of 9.3 (each normal),  5.Stools FOB, negative x3, 06/27/2017.     PREVIOUS INTERVENTIONS:    1.Ferrous sulfate 325 mg p.o. twice daily since 05/2017 (per patient) through 10/16/2017.  2.B12, 1000 mcg monthly beginning 10/04/2017 and 11/06/2017.  3.INFeD 500 mg on 10/16/2017; 12/13/2017; 02/06/2018; 04/11/2018.  4.Venofer 200 mg IV, 11/12, 11/13, 11/14/2018 (600 mg)    ADULT ILLNESSES:    •Microcytic anemia ( ICD-10:D50.9 ;Iron deficiency anemia, unspecified  •Anemia ( ICD-10:D64.9 ;Anemia, unspecified  •Anxiety ( ICD-10:F41.9 ;Anxiety disorder, unspecified  •Arthritis (disorder) ( ICD-10:M19.90 ;Unspecified osteoarthritis, unspecified site  •Chronic kidney disease ( Stage III. GFR 35.39 mL/minute, 04/24/2017; ICD-10:N18.3 ;Chronic kidney disease, stage 3 (moderate) )  •Depression ( ICD-10:F32.9 ;Major depressive disorder, single episode, unspecified  •Diverticulosis ( history of; ICD-10:K57.90 ;Diverticulosis of intestine, part unspecified, without perforation or abscess without bleeding )  •Drug intolerance ( oral iron; ICD-10:T45.4x5D ;Adverse effect of iron and its compounds, subsequent encounter )  •Gastritis (disorder) ( ICD-10:K29.70 ;Gastritis, unspecified, without bleeding  •Hypertension ( ICD-10:I10 ;Essential (primary) hypertension  •Internal hemorrhoids ( ICD-10:K64.8 ;Other hemorrhoids  •Iron deficiency anemia (disorder) ( ICD-10:D50.9 ;Iron deficiency anemia, unspecified  •Malabsorption ( ICD-10:K90.9 ;Intestinal malabsorption, unspecified  •Urinary tract infection ( recurrent; ICD-10:N39.0 ;Urinary tract infection, site not specified )  •Vitamin B12 deficiency ( ICD-10:E53.8 ;Deficiency of other specified B group vitamins    SURGERIES:    •Bilateral cataracts, 11/2019 and 12/2019  •Hernia repair,  "2011  •Partial colectomy with hernia repair  •Cystoscopy, 01/30/2018, Dr. Aguilera (urology). Recurrent urinary tract infections. \"All looked good\"  •Total abdominal hysterectomy with bilateral salpingo-oophorectomy, 2011  •EGD, 2016. \"Hiatal hernia\"  •EGD, 06/24/2017. Irregular Z-line at EG junction. Biopsies taken to rule out Ortiz's metaplasia. A 6 mm long sliding hiatal hernia. Mild gastritis, biopsy taken. Small bowel biopsies taken to rule out celiac disease. Capsule endoscopy recommended as outpatient  •Colonoscopy, 06/24/2016. Diverticular disease. Small rectal internal hemorrhoids. Dr. Villar      Problem List Items Addressed This Visit        Other    Normocytic anemia - Primary    Overview     DIAGNOSTIC ABNORMALITIES:  Colonoscopy, 06/24/2016.  Diverticular disease. Small rectal internal hemorrhoids. Dr. Villar.  EGD, 06/24/2017. Irregular Z-line at EG junction. Biopsies taken to rule out Ortiz's metaplasia. 6 mm long sliding hiatal hernia. Mild gastritis, biopsy taken. Small bowel biopsies taken to rule out celiac disease. Capsule endoscopy recommended as outpatient.  Labs 04/24/2017, Dr. Barron: Hemoglobin 9.9, hematocrit 31.1, MCV 81 (81 - 99), MCH 25.8 (27 - 31), MCHC 31.8 (33 - 37), RDW 14.8 (12.3 - 15.1), platelets 272,000, WBC 7.9, normal differential. CMP: Sodium 135 (depressed), glucose 132, BUN 51, creatinine 1.54 (each elevated), EGFR 35.39 (depressed), calcium 8.8, total protein 7.5, total bilirubin 0.3, AST 17, ALT 24, alkaline phos 73 (each normal). Serum iron 31 (50 - 175), TIBC 349 (250 - 450), B12 of 865, ferritin 40.\[id number], 06/16/2017: Hemoglobin 10. 5, hematocrit 33.5, MCV 82.4, MCH 25.9, MCHC 31.3 (each depressed), platelets 230,000, WBC 8.0, glucose 127, BUN 40, creatinine 1.4 (each elevated), GFR 39.5 (low),  (normal) iron 49, Fe sat 12.3%, ferritin 43, B12 of 236 (each depressed), folate 7.1, SIMBA negative, SPIEP normal. JULIAN:  No monoclonal immunoglobulins, TSH 3.5, T4 of " 9.3 (each normal),Stools FOB, negative x3, 06/27/2017.    PREVIOUS INTERVENTIONS:  Ferrous sulfate 325 mg p.o. twice daily since 05/2017 (per patient) through 10/16/2017.    B12, 1000 mcg monthly beginning 10/04/2017 and 11/06/2017.    INFeD 500 mg on 10/16/2017; 12/13/2017; 02/06/2018; 04/11/2018.    Venofer 200 mg IV, 11/12, 11/13, 11/14/2018 (600 mg)           B12 deficiency        Oncology/Hematology History    No history exists.       PAST MEDICAL HISTORY:  ALLERGIES:  No Known Allergies  CURRENT MEDICATIONS:  Outpatient Encounter Medications as of 6/15/2022   Medication Sig Dispense Refill   • calcitriol (ROCALTROL) 0.25 MCG capsule calcitriol 0.25 mcg capsule     • cholecalciferol (Cholecalciferol) 25 MCG (1000 UT) tablet D3-2000 2,000 unit capsule   TAKE ONE CAPSULE BY MOUTH ONCE DAILY.     • folic acid-vit B6-vit B12 (Folbee) 2.5-25-1 MG tablet tablet Take 1 tablet by mouth Daily. 30 tablet 0   • furosemide (LASIX) 20 MG tablet Take 20 mg by mouth Daily.     • metoprolol succinate XL (TOPROL-XL) 50 MG 24 hr tablet metoprolol succinate ER 50 mg tablet,extended release 24 hr     • olmesartan (BENICAR) 20 MG tablet olmesartan 20 mg tablet     • oxybutynin XL (DITROPAN-XL) 10 MG 24 hr tablet      • pantoprazole (PROTONIX) 40 MG EC tablet      • SITagliptin (JANUVIA) 100 MG tablet Januvia 100 mg tablet   Take 1 tablet every day by oral route.     • vitamin C (ASCORBIC ACID) 250 MG tablet Take 250 mg by mouth Daily.       No facility-administered encounter medications on file as of 6/15/2022.     ADULT ILLNESSES:  Patient Active Problem List   Diagnosis Code   • Normocytic anemia D64.9   • Iron deficiency E61.1   • CKD (chronic kidney disease), stage III (HCC) N18.30   • B12 deficiency E53.8   • Chronic gastritis K29.50   • Hiatal hernia K44.9   • Hypertension I10   • Anemia in stage 3 chronic kidney disease (HCC) N18.30, D63.1   • Iron malabsorption K90.9   • Adiposity E66.9   • Calculus of gallbladder K80.20  "  • Chronic obstructive pulmonary disease (HCC) J44.9   • Diabetes mellitus (HCC) E11.9   • Gastroesophageal reflux disease K21.9   • Incisional hernia K43.2   • Left inguinal hernia K40.90     SURGERIES:  Past Surgical History:   Procedure Laterality Date   • CATARACT EXTRACTION, BILATERAL  11/2019, 12/2019   • COLECTOMY PARTIAL / TOTAL      Partial colectomy with hernia repair   • COLONOSCOPY  06/24/2016    Diverticular disease. Small rectal internal hemorrhoids. Dr. Villar   • CYSTOSCOPY  01/30/2018    Dr. Aguilera (urology). Recurrent urinary tract infections. \"All looked good\"   • ENDOSCOPY  2016    \"Hiatal hernia\"   • ENDOSCOPY  06/24/2017     Irregular Z-line at EG junction. Biopsies taken to rule out Ortiz's metaplasia. A 6 mm long sliding hiatal hernia. Mild gastritis, biopsy taken. Small bowel biopsies taken to rule out celiac disease. Capsule endoscopy recommended as outpatient   • HERNIA REPAIR  2011   • TOTAL ABDOMINAL HYSTERECTOMY WITH SALPINGO OOPHORECTOMY  2011     HEALTH MAINTENANCE ITEMS:  Health Maintenance Due   Topic Date Due   • URINE MICROALBUMIN  Never done   • DXA SCAN  Never done   • COVID-19 Vaccine (1) Never done   • Pneumococcal Vaccine 65+ (1 - PCV) Never done   • TDAP/TD VACCINES (1 - Tdap) Never done   • ZOSTER VACCINE (1 of 2) Never done   • HEPATITIS C SCREENING  Never done   • ANNUAL WELLNESS VISIT  08/12/2019   • DIABETIC FOOT EXAM  Never done   • DIABETIC EYE EXAM  06/29/2021   • HEMOGLOBIN A1C  01/19/2022       <no information>  Last Completed Colonoscopy     This patient has no relevant Health Maintenance data.          There is no immunization history on file for this patient.  Last Completed Mammogram     This patient has no relevant Health Maintenance data.            FAMILY HISTORY:  History reviewed. No pertinent family history.  SOCIAL HISTORY:  Social History     Socioeconomic History   • Marital status:    Tobacco Use   • Smoking status: Former Smoker   • " "Smokeless tobacco: Never Used       REVIEW OF SYSTEMS:  Constitutional:  The patient's appetite is fairly good. \"Good.\" Her energy is chronically low to fair.  \"It's ok for me.\" She manages her personal ADLs and some house chores. \"I do what I can.\" Says she is driven around by her children.  Her son previously passed away so she now lives with her daughter, Meagan.  She has lost 10 pounds (had gained 3 pounds at her prior visit) since her last visit. \"Fluid pills.\" She has no fevers, chills, or drenching night sweats. Sleep habits seem appropriate.  Ear/Nose/Throat/Mouth:  No ear pains, sore throat, nosebleeds, or sore tongue. She has no headaches. She denies any hoarseness.   Ocular:  She reports no eye pain, significant change in visual acuity, double vision, or blurry vision.  Improved since cataract surgeries. Is followed by Dr. Magdaleno  Respiratory:  She reports no chronic cough, significant shortness of breathing, phlegm production, or unexplained chest wall pain.  Cardiovascular:  She reports no exertional chest pain, chest pressure, or chest heaviness. She reports no claudication. She reports no palpitations or symptomatic orthostasis.  Gastrointestinal:  She reports no pica, no dysphagia, nausea, vomiting, postprandial abdominal pain, bloating, cramping, or change in bowel habits. No dark (off oral iron) discoloration of the stool. She reports no rectal bleeding, constipation, or diarrhea.  Last EGD and colonoscopy in 2016. \"Hiatal hernia.\" Oral iron intolerant - constipation.  Genitourinary:  She currently has no urinary burning, frequency, dribbling, or discoloration. She reports difficulty controlling her bladder (cough/sneeze incontinence). Wears adult pads.  She has no need to urinate frequently through the night.  Was seen by Dr. Williamson (urology) for cystoscopy, 01/30/2018.  \"All looked good.\"  Musculoskeletal:  She reports no unexplained arthralgias, myalgias, or nighttime leg " "cramping.  Extremities:  She reports no trouble with fluid retention or significant leg swelling.  Is on diuretics qod/qd (Lasix)  Endocrine:  She reports no problems with excess thirst, excessive urination, vasomotor instability, or unexplained fatigue.  Heme/Lymphatic:  She reports no unexplained bleeding, bruising, petechial rashes, or swollen glands.  Skin:  She reports no itching, rashes, or lesions which won't heal.  Neuro:  She uses a walker for support due to gait unsteadiness but reports no loss of consciousness, seizures, fainting spells, or dizziness. She reports no weakness of face, arms, or legs. She has no difficulty with speech. She has no tremors.  She has no paresthesias.  Psych:  She seems generally satisfied with life. She has not had bouts of depression nor anxiety, \"I've been ok.\"        VITAL SIGNS: /60   Pulse 82   Temp 97.9 °F (36.6 °C)   Resp 16   Ht 162.6 cm (64\")   Wt 110 kg (243 lb)   SpO2 92%   Breastfeeding No   BMI 41.71 kg/m²  Body surface area is 2.12 meters squared.   Pain Score    06/15/22 1321   PainSc: 0-No pain       PHYSICAL EXAMINATION:  General:   She is a pleasant, cooperative, morbidly obese and modestly-kept elderly female who is comfortable at rest. She arrived in the exam room ambulatory with a walker. She appears to be her stated age. Her skin color is a bit pale.   Head/Neck:   The patient is anicteric and atraumatic. She is wearing a surgical mask today. The trachea is midline. The neck is supple without evidence of jugular venous distention or cervical adenopathy.   Eyes:   The pupils are equal, round, and reactive to light. The extraocular movements are full. There is no scleral jaundice or erythema.   Chest:   The respiratory efforts are normal and unhindered. The chest is clear to auscultation and percussion. There are no wheezes, rhonchi, rales, or asymmetry of breath sounds.  Cardiovascular:   The patient has a regular cardiac rate and rhythm " without murmurs, rubs, or gallops.   Abdomen:   The belly is soft and morbidly obese, again with a firm, nontender ventral hernia and large panniculus. Her habitus precludes an adequate exam. There is no rebound or guarding. There is no overt organomegaly, mass-effect, or tenderness. Bowel sounds are active and of normal character.  Extremities:   There is no evidence of cyanosis, clubbing, with 1+ bipedal edema.  Rheumatologic:   There is no overt evidence of rheumatoid deformities of the hands. There is no sausaging of the fingers. There is no sign of active synovitis. The gait is slow, stooped  and walker supported.  Cutaneous:   There are no overt rashes, disseminated lesions, purpura, or petechiae.   Lymphatics:   There is no evidence of adenopathy in the cervical, supraclavicular, or axillary areas.  Neurologic:   The patient is alert, oriented, cooperative, and pleasant. She is appropriately conversant. She ambulated into the exam room without assistance but declined transfer from chair to exam table. There is no overt dysfunction of the motor, sensory, cerebellar systems.  Psych:   Mood and affect are appropriate for circumstance. Eye contact is appropriate. Normal judgement and decision making.                                                              ASSESSMENT:  1.    Normocytic (borderline microcytic) anemia.    --Stable, Hgb 12.7; MCV  85, 06/13/2022 (prior range:  Hgb 9.7 - 12.5; MCV 81 - 96.6).  Contributions from iron deficiency, iron underutilization (anemia of chronic disease), and chronic kidney disease.    2.    Iron deficiency.  Repleted with ferritin > 600, 02/23/2021 since last Injectafer.  3.    History of chronic gastritis and hiatal hernia.  4.    Chronic kidney disease, Stage III.   --GFR ? mL/min, 06/13/2022 (prior range:  31.6 - 52 mL/min).  5.    History of diverticulosis. No diverticulitis  6.    Internal hemorrhoids. No recent bleeding  7.    Arthritis. Chronic symptoms  8.     "Hypertension. On metoprolol and Benicar  9.    Oral iron failure and intolerance (constipation).  10.  Low B12.   Previously on oral supplements.  11.  Recurrent urinary tract infections (UTIs).  None in the interval since her visit, 12/07/2020  --Was seen by Dr. Williamson (urology) for cystoscopy, 01/30/2018.  \"All looked good.\"  -- Says she was admitted [facility] [place], end of July through 08/07/2020 for COVID symptoms and UTI  12.  Oral iron intolerant - severe constipation      RECOMMENDATIONS:  1.    Apprise of labs from 06/13/2022 with resolution of anemia otherwise normal CBC,  repleted iron levels (ferritin not reported), depressed (196) B12, repleted folate and stable CMP.  2.    Rx:  Folbee po daily # 30 x 4 RF  3.    Previously noted the labs from 06/16/2017 including the normal SPIEP, low GFR but otherwise stable CMP, negative SIMBA, low B12, normal folate, low serum Fe, normal TIBC, low ferritin, low transferrin saturation, normal LDH/T4 and TSH.  Also noted the negative stools for fecal occult blood (FOB) x3.  4.    The rationale and potential toxicities (including the risk for increased mortality from stroke, myocardial infarction (MI), congestive heart failure (CHF), seizures, sepsis, allergic reactions) for ULISES support previously discussed. Questions answered.  She will agree to proceed with a trial of therapy if and when it becomes indicated.   5.   CBC w diff every 4 weeks and Procrit 40,000 units sc if Hgb <10 or Hct < 30 at [facility] [place]  6.    Return to the [place] office in 16 weeks with pre-office (fasting) CMP, B12, folate, serum iron, Fe sat, ferritin, CBC with differential.  Consider IV iron if iron levels and anemia worsen.    MEDICAL DECISION MAKING: Moderate Complexity  AMOUNT OF DATA: Moderate     I spent ~36 minutes caring for Shireen on this date of service. This time includes time spent by me in the following activities: preparing for the visit, reviewing tests, performing a medically appropriate " examination and/or evaluation, counseling and educating the patient/family/caregiver, ordering medications, tests, or procedures and documenting information in the medical record    cc:  MD Nadja Burgos, APRN

## 2022-06-13 ENCOUNTER — TELEPHONE (OUTPATIENT)
Dept: ONCOLOGY | Facility: CLINIC | Age: 77
End: 2022-06-13

## 2022-06-13 NOTE — TELEPHONE ENCOUNTER
Caller: Shireen Greer    Relationship to patient: Self    Best call back number: [phone]    Chief complaint: PATIENT CALLED WANTED TO SEE IF THERE WAS A LATER TIME    Type of visit: FOLLOW UP    Requested date: 6-15-22 LATER TIME    If rescheduling, when is the original appointment: 6-15-22

## 2022-06-15 ENCOUNTER — OFFICE VISIT (OUTPATIENT)
Dept: ONCOLOGY | Facility: CLINIC | Age: 77
End: 2022-06-15

## 2022-06-15 VITALS
HEIGHT: 64 IN | HEART RATE: 82 BPM | SYSTOLIC BLOOD PRESSURE: 108 MMHG | RESPIRATION RATE: 16 BRPM | WEIGHT: 243 LBS | TEMPERATURE: 97.9 F | OXYGEN SATURATION: 92 % | BODY MASS INDEX: 41.48 KG/M2 | DIASTOLIC BLOOD PRESSURE: 60 MMHG

## 2022-06-15 DIAGNOSIS — E53.8 B12 DEFICIENCY: ICD-10-CM

## 2022-06-15 DIAGNOSIS — E53.8 LOW VITAMIN B12 LEVEL: ICD-10-CM

## 2022-06-15 DIAGNOSIS — D64.9 NORMOCYTIC ANEMIA: Primary | ICD-10-CM

## 2022-06-15 PROCEDURE — 99214 OFFICE O/P EST MOD 30 MIN: CPT | Performed by: INTERNAL MEDICINE

## 2022-06-15 RX ORDER — CYANOCOBALAMIN/FOLIC AC/VIT B6 1-2.5-25MG
1 TABLET ORAL DAILY
Qty: 30 TABLET | Refills: 4 | Status: SHIPPED | OUTPATIENT
Start: 2022-06-15

## 2022-08-31 ENCOUNTER — TELEPHONE (OUTPATIENT)
Dept: ONCOLOGY | Facility: CLINIC | Age: 77
End: 2022-08-31

## 2022-08-31 NOTE — TELEPHONE ENCOUNTER
Caller: Shireen Greer    Relationship: Self    Best call back number: [phone]    What orders are you requesting (i.e. lab or imaging): LABS    In what timeframe would the patient need to come in: GOING END OF SEPTEMBER    Where will you receive your lab/imaging services: DR. BONILLA'S OFFICE

## 2022-12-27 DIAGNOSIS — E53.8 LOW VITAMIN B12 LEVEL: ICD-10-CM

## 2022-12-27 RX ORDER — CYANOCOBALAMIN/FOLIC AC/VIT B6 1-2.5-25MG
TABLET ORAL
Qty: 30 TABLET | Refills: 0 | OUTPATIENT
Start: 2022-12-27

## 2023-01-04 ENCOUNTER — TELEPHONE (OUTPATIENT)
Dept: ONCOLOGY | Facility: CLINIC | Age: 78
End: 2023-01-04
Payer: MEDICARE

## 2023-01-04 NOTE — TELEPHONE ENCOUNTER
----- Message from Vignesh Holman MD sent at 1/3/2023 10:47 AM CST -----  Pls confirm that pt has been referred to surgery at [facility] re: acute cholecystitis  ----- Message -----  From: [facility] Incoming  Sent: 1/3/2023   9:49 AM CST  To: Vignesh Holman MD

## 2023-01-04 NOTE — TELEPHONE ENCOUNTER
Spoke with Jewels and she stated that Dr. Gonzales was going to do surgery today to remove her gallbladder but her oxygen level dropped and she is in ICU at the present time and she will have surgery once she is stable.

## 2023-01-21 ENCOUNTER — APPOINTMENT (OUTPATIENT)
Dept: MRI IMAGING | Facility: HOSPITAL | Age: 78
End: 2023-01-21
Payer: MEDICARE

## 2023-01-21 ENCOUNTER — HOSPITAL ENCOUNTER (OUTPATIENT)
Facility: HOSPITAL | Age: 78
Setting detail: OBSERVATION
LOS: 3 days | Discharge: NURSING, REHAB OR LONG-TERM CARE | End: 2023-01-26
Attending: EMERGENCY MEDICINE | Admitting: INTERNAL MEDICINE
Payer: MEDICARE

## 2023-01-21 ENCOUNTER — APPOINTMENT (OUTPATIENT)
Dept: GENERAL RADIOLOGY | Facility: HOSPITAL | Age: 78
End: 2023-01-21
Payer: MEDICARE

## 2023-01-21 ENCOUNTER — APPOINTMENT (OUTPATIENT)
Dept: CT IMAGING | Facility: HOSPITAL | Age: 78
End: 2023-01-21
Payer: MEDICARE

## 2023-01-21 DIAGNOSIS — D64.9 ANEMIA, UNSPECIFIED TYPE: ICD-10-CM

## 2023-01-21 DIAGNOSIS — N39.0 ACUTE UTI (URINARY TRACT INFECTION): ICD-10-CM

## 2023-01-21 DIAGNOSIS — Z74.09 IMPAIRED MOBILITY: ICD-10-CM

## 2023-01-21 DIAGNOSIS — Z78.9 DECREASED ACTIVITIES OF DAILY LIVING (ADL): ICD-10-CM

## 2023-01-21 DIAGNOSIS — N17.9 AKI (ACUTE KIDNEY INJURY): ICD-10-CM

## 2023-01-21 DIAGNOSIS — R13.10 DYSPHAGIA, UNSPECIFIED TYPE: ICD-10-CM

## 2023-01-21 DIAGNOSIS — R41.82 ALTERED MENTAL STATUS, UNSPECIFIED ALTERED MENTAL STATUS TYPE: Primary | ICD-10-CM

## 2023-01-21 LAB
ALBUMIN SERPL-MCNC: 3.3 G/DL (ref 3.5–5.2)
ALBUMIN/GLOB SERPL: 0.8 G/DL
ALP SERPL-CCNC: 93 U/L (ref 39–117)
ALT SERPL W P-5'-P-CCNC: 16 U/L (ref 1–33)
AMORPH URATE CRY URNS QL MICRO: ABNORMAL /HPF
ANION GAP SERPL CALCULATED.3IONS-SCNC: 15 MMOL/L (ref 5–15)
AST SERPL-CCNC: 32 U/L (ref 1–32)
BACTERIA UR QL AUTO: ABNORMAL /HPF
BASOPHILS # BLD AUTO: 0.03 10*3/MM3 (ref 0–0.2)
BASOPHILS NFR BLD AUTO: 0.2 % (ref 0–1.5)
BILIRUB SERPL-MCNC: 0.6 MG/DL (ref 0–1.2)
BILIRUB UR QL STRIP: NEGATIVE
BUN SERPL-MCNC: 15 MG/DL (ref 8–23)
BUN/CREAT SERPL: 10.1 (ref 7–25)
CALCIUM SPEC-SCNC: 9.2 MG/DL (ref 8.6–10.5)
CHLORIDE SERPL-SCNC: 92 MMOL/L (ref 98–107)
CLARITY UR: ABNORMAL
CO2 SERPL-SCNC: 33 MMOL/L (ref 22–29)
COLOR UR: YELLOW
CREAT SERPL-MCNC: 1.49 MG/DL (ref 0.57–1)
D-LACTATE SERPL-SCNC: 1.7 MMOL/L (ref 0.5–2)
DEPRECATED RDW RBC AUTO: 46.5 FL (ref 37–54)
EGFRCR SERPLBLD CKD-EPI 2021: 36.3 ML/MIN/1.73
EOSINOPHIL # BLD AUTO: 0.18 10*3/MM3 (ref 0–0.4)
EOSINOPHIL NFR BLD AUTO: 1.4 % (ref 0.3–6.2)
ERYTHROCYTE [DISTWIDTH] IN BLOOD BY AUTOMATED COUNT: 14.6 % (ref 12.3–15.4)
GLOBULIN UR ELPH-MCNC: 4.3 GM/DL
GLUCOSE BLDC GLUCOMTR-MCNC: 168 MG/DL (ref 70–130)
GLUCOSE SERPL-MCNC: 168 MG/DL (ref 65–99)
GLUCOSE UR STRIP-MCNC: NEGATIVE MG/DL
HCT VFR BLD AUTO: 29.4 % (ref 34–46.6)
HGB BLD-MCNC: 8.9 G/DL (ref 12–15.9)
HGB UR QL STRIP.AUTO: ABNORMAL
HOLD SPECIMEN: NORMAL
HYALINE CASTS UR QL AUTO: ABNORMAL /LPF
IMM GRANULOCYTES # BLD AUTO: 0.1 10*3/MM3 (ref 0–0.05)
IMM GRANULOCYTES NFR BLD AUTO: 0.8 % (ref 0–0.5)
KETONES UR QL STRIP: NEGATIVE
LEUKOCYTE ESTERASE UR QL STRIP.AUTO: ABNORMAL
LYMPHOCYTES # BLD AUTO: 1.4 10*3/MM3 (ref 0.7–3.1)
LYMPHOCYTES NFR BLD AUTO: 11.2 % (ref 19.6–45.3)
MCH RBC QN AUTO: 26.5 PG (ref 26.6–33)
MCHC RBC AUTO-ENTMCNC: 30.3 G/DL (ref 31.5–35.7)
MCV RBC AUTO: 87.5 FL (ref 79–97)
MONOCYTES # BLD AUTO: 1.29 10*3/MM3 (ref 0.1–0.9)
MONOCYTES NFR BLD AUTO: 10.3 % (ref 5–12)
NEUTROPHILS NFR BLD AUTO: 76.1 % (ref 42.7–76)
NEUTROPHILS NFR BLD AUTO: 9.52 10*3/MM3 (ref 1.7–7)
NITRITE UR QL STRIP: NEGATIVE
NRBC BLD AUTO-RTO: 0 /100 WBC (ref 0–0.2)
PH UR STRIP.AUTO: 8.5 [PH] (ref 5–8)
PLATELET # BLD AUTO: 186 10*3/MM3 (ref 140–450)
PMV BLD AUTO: 10.7 FL (ref 6–12)
POTASSIUM SERPL-SCNC: 3.9 MMOL/L (ref 3.5–5.2)
PROT SERPL-MCNC: 7.6 G/DL (ref 6–8.5)
PROT UR QL STRIP: ABNORMAL
RBC # BLD AUTO: 3.36 10*6/MM3 (ref 3.77–5.28)
RBC # UR STRIP: ABNORMAL /HPF
REF LAB TEST METHOD: ABNORMAL
SODIUM SERPL-SCNC: 140 MMOL/L (ref 136–145)
SP GR UR STRIP: 1.01 (ref 1–1.03)
SQUAMOUS #/AREA URNS HPF: ABNORMAL /HPF
UROBILINOGEN UR QL STRIP: ABNORMAL
WBC # UR STRIP: ABNORMAL /HPF
WBC NRBC COR # BLD: 12.52 10*3/MM3 (ref 3.4–10.8)
WHOLE BLOOD HOLD COAG: NORMAL
WHOLE BLOOD HOLD SPECIMEN: NORMAL

## 2023-01-21 PROCEDURE — 99285 EMERGENCY DEPT VISIT HI MDM: CPT

## 2023-01-21 PROCEDURE — 94640 AIRWAY INHALATION TREATMENT: CPT

## 2023-01-21 PROCEDURE — 94799 UNLISTED PULMONARY SVC/PX: CPT

## 2023-01-21 PROCEDURE — 36415 COLL VENOUS BLD VENIPUNCTURE: CPT | Performed by: FAMILY MEDICINE

## 2023-01-21 PROCEDURE — 83605 ASSAY OF LACTIC ACID: CPT | Performed by: FAMILY MEDICINE

## 2023-01-21 PROCEDURE — 71045 X-RAY EXAM CHEST 1 VIEW: CPT

## 2023-01-21 PROCEDURE — 81001 URINALYSIS AUTO W/SCOPE: CPT | Performed by: EMERGENCY MEDICINE

## 2023-01-21 PROCEDURE — 25010000002 CEFTRIAXONE PER 250 MG: Performed by: FAMILY MEDICINE

## 2023-01-21 PROCEDURE — 92610 EVALUATE SWALLOWING FUNCTION: CPT

## 2023-01-21 PROCEDURE — 87086 URINE CULTURE/COLONY COUNT: CPT | Performed by: EMERGENCY MEDICINE

## 2023-01-21 PROCEDURE — 70551 MRI BRAIN STEM W/O DYE: CPT

## 2023-01-21 PROCEDURE — 70450 CT HEAD/BRAIN W/O DYE: CPT

## 2023-01-21 PROCEDURE — 96360 HYDRATION IV INFUSION INIT: CPT

## 2023-01-21 PROCEDURE — 85025 COMPLETE CBC W/AUTO DIFF WBC: CPT | Performed by: EMERGENCY MEDICINE

## 2023-01-21 PROCEDURE — 94664 DEMO&/EVAL PT USE INHALER: CPT

## 2023-01-21 PROCEDURE — 94761 N-INVAS EAR/PLS OXIMETRY MLT: CPT

## 2023-01-21 PROCEDURE — 87077 CULTURE AEROBIC IDENTIFY: CPT | Performed by: EMERGENCY MEDICINE

## 2023-01-21 PROCEDURE — 80053 COMPREHEN METABOLIC PANEL: CPT | Performed by: EMERGENCY MEDICINE

## 2023-01-21 PROCEDURE — P9612 CATHETERIZE FOR URINE SPEC: HCPCS

## 2023-01-21 PROCEDURE — 82962 GLUCOSE BLOOD TEST: CPT

## 2023-01-21 PROCEDURE — 93005 ELECTROCARDIOGRAM TRACING: CPT | Performed by: EMERGENCY MEDICINE

## 2023-01-21 PROCEDURE — 87040 BLOOD CULTURE FOR BACTERIA: CPT | Performed by: FAMILY MEDICINE

## 2023-01-21 PROCEDURE — 93010 ELECTROCARDIOGRAM REPORT: CPT | Performed by: INTERNAL MEDICINE

## 2023-01-21 PROCEDURE — 87186 SC STD MICRODIL/AGAR DIL: CPT | Performed by: EMERGENCY MEDICINE

## 2023-01-21 RX ORDER — ATORVASTATIN CALCIUM 10 MG/1
20 TABLET, FILM COATED ORAL NIGHTLY
Status: DISCONTINUED | OUTPATIENT
Start: 2023-01-21 | End: 2023-01-26 | Stop reason: HOSPADM

## 2023-01-21 RX ORDER — HYDROXYZINE HYDROCHLORIDE 25 MG/1
25 TABLET, FILM COATED ORAL EVERY 6 HOURS PRN
Status: DISCONTINUED | OUTPATIENT
Start: 2023-01-21 | End: 2023-01-26 | Stop reason: HOSPADM

## 2023-01-21 RX ORDER — ASPIRIN 81 MG/1
81 TABLET, CHEWABLE ORAL DAILY
Status: DISCONTINUED | OUTPATIENT
Start: 2023-01-21 | End: 2023-01-26 | Stop reason: HOSPADM

## 2023-01-21 RX ORDER — CYCLOBENZAPRINE HCL 10 MG
10 TABLET ORAL DAILY PRN
Status: ON HOLD | COMMUNITY
End: 2023-01-21

## 2023-01-21 RX ORDER — ONDANSETRON 2 MG/ML
4 INJECTION INTRAMUSCULAR; INTRAVENOUS EVERY 6 HOURS PRN
Status: DISCONTINUED | OUTPATIENT
Start: 2023-01-21 | End: 2023-01-26 | Stop reason: HOSPADM

## 2023-01-21 RX ORDER — BISACODYL 5 MG/1
5 TABLET, DELAYED RELEASE ORAL DAILY PRN
Status: DISCONTINUED | OUTPATIENT
Start: 2023-01-21 | End: 2023-01-26 | Stop reason: HOSPADM

## 2023-01-21 RX ORDER — SODIUM CHLORIDE 0.9 % (FLUSH) 0.9 %
10 SYRINGE (ML) INJECTION EVERY 12 HOURS SCHEDULED
Status: DISCONTINUED | OUTPATIENT
Start: 2023-01-21 | End: 2023-01-26 | Stop reason: HOSPADM

## 2023-01-21 RX ORDER — POLYETHYLENE GLYCOL 3350 17 G/17G
17 POWDER, FOR SOLUTION ORAL DAILY PRN
Status: DISCONTINUED | OUTPATIENT
Start: 2023-01-21 | End: 2023-01-26 | Stop reason: HOSPADM

## 2023-01-21 RX ORDER — MELATONIN
500 DAILY
Status: DISCONTINUED | OUTPATIENT
Start: 2023-01-22 | End: 2023-01-26 | Stop reason: HOSPADM

## 2023-01-21 RX ORDER — PANTOPRAZOLE SODIUM 40 MG/1
40 TABLET, DELAYED RELEASE ORAL
Status: DISCONTINUED | OUTPATIENT
Start: 2023-01-22 | End: 2023-01-26 | Stop reason: HOSPADM

## 2023-01-21 RX ORDER — CALCITRIOL 0.25 UG/1
0.25 CAPSULE, LIQUID FILLED ORAL DAILY
Status: DISCONTINUED | OUTPATIENT
Start: 2023-01-22 | End: 2023-01-26 | Stop reason: HOSPADM

## 2023-01-21 RX ORDER — IPRATROPIUM BROMIDE AND ALBUTEROL SULFATE 2.5; .5 MG/3ML; MG/3ML
3 SOLUTION RESPIRATORY (INHALATION)
Status: DISCONTINUED | OUTPATIENT
Start: 2023-01-21 | End: 2023-01-26 | Stop reason: HOSPADM

## 2023-01-21 RX ORDER — HYDROXYZINE PAMOATE 25 MG/1
25 CAPSULE ORAL EVERY 6 HOURS PRN
Status: DISCONTINUED | OUTPATIENT
Start: 2023-01-21 | End: 2023-01-21

## 2023-01-21 RX ORDER — BISACODYL 10 MG
10 SUPPOSITORY, RECTAL RECTAL DAILY PRN
Status: DISCONTINUED | OUTPATIENT
Start: 2023-01-21 | End: 2023-01-26 | Stop reason: HOSPADM

## 2023-01-21 RX ORDER — SODIUM CHLORIDE 9 MG/ML
40 INJECTION, SOLUTION INTRAVENOUS AS NEEDED
Status: DISCONTINUED | OUTPATIENT
Start: 2023-01-21 | End: 2023-01-26 | Stop reason: HOSPADM

## 2023-01-21 RX ORDER — ERGOCALCIFEROL 1.25 MG/1
50000 CAPSULE ORAL WEEKLY
COMMUNITY

## 2023-01-21 RX ORDER — SODIUM CHLORIDE 0.9 % (FLUSH) 0.9 %
10 SYRINGE (ML) INJECTION AS NEEDED
Status: DISCONTINUED | OUTPATIENT
Start: 2023-01-21 | End: 2023-01-26 | Stop reason: HOSPADM

## 2023-01-21 RX ORDER — ASPIRIN 300 MG/1
300 SUPPOSITORY RECTAL DAILY
Status: DISCONTINUED | OUTPATIENT
Start: 2023-01-21 | End: 2023-01-23

## 2023-01-21 RX ORDER — METOPROLOL TARTRATE 50 MG/1
50 TABLET, FILM COATED ORAL 2 TIMES DAILY
COMMUNITY

## 2023-01-21 RX ORDER — SODIUM CHLORIDE, SODIUM LACTATE, POTASSIUM CHLORIDE, CALCIUM CHLORIDE 600; 310; 30; 20 MG/100ML; MG/100ML; MG/100ML; MG/100ML
100 INJECTION, SOLUTION INTRAVENOUS CONTINUOUS
Status: DISCONTINUED | OUTPATIENT
Start: 2023-01-21 | End: 2023-01-22

## 2023-01-21 RX ORDER — LOSARTAN POTASSIUM 50 MG/1
50 TABLET ORAL DAILY
COMMUNITY

## 2023-01-21 RX ORDER — AMOXICILLIN 250 MG
2 CAPSULE ORAL 2 TIMES DAILY
Status: DISCONTINUED | OUTPATIENT
Start: 2023-01-21 | End: 2023-01-26 | Stop reason: HOSPADM

## 2023-01-21 RX ADMIN — HYDROXYZINE HYDROCHLORIDE 25 MG: 25 TABLET ORAL at 20:19

## 2023-01-21 RX ADMIN — IPRATROPIUM BROMIDE AND ALBUTEROL SULFATE 3 ML: 2.5; .5 SOLUTION RESPIRATORY (INHALATION) at 19:04

## 2023-01-21 RX ADMIN — SODIUM CHLORIDE, POTASSIUM CHLORIDE, SODIUM LACTATE AND CALCIUM CHLORIDE 75 ML/HR: 600; 310; 30; 20 INJECTION, SOLUTION INTRAVENOUS at 16:02

## 2023-01-21 RX ADMIN — ASPIRIN 81 MG: 81 TABLET, CHEWABLE ORAL at 16:37

## 2023-01-21 RX ADMIN — SODIUM CHLORIDE, POTASSIUM CHLORIDE, SODIUM LACTATE AND CALCIUM CHLORIDE 1000 ML: 600; 310; 30; 20 INJECTION, SOLUTION INTRAVENOUS at 13:55

## 2023-01-21 RX ADMIN — ATORVASTATIN CALCIUM 20 MG: 10 TABLET, FILM COATED ORAL at 20:20

## 2023-01-21 RX ADMIN — CEFTRIAXONE 1 G: 1 INJECTION, POWDER, FOR SOLUTION INTRAMUSCULAR; INTRAVENOUS at 16:37

## 2023-01-21 RX ADMIN — Medication 10 ML: at 20:20

## 2023-01-21 RX ADMIN — DOCUSATE SODIUM 50 MG AND SENNOSIDES 8.6 MG 2 TABLET: 8.6; 5 TABLET, FILM COATED ORAL at 20:20

## 2023-01-22 ENCOUNTER — APPOINTMENT (OUTPATIENT)
Dept: CARDIOLOGY | Facility: HOSPITAL | Age: 78
End: 2023-01-22
Payer: MEDICARE

## 2023-01-22 LAB
ALBUMIN SERPL-MCNC: 2.7 G/DL (ref 3.5–5.2)
ALBUMIN/GLOB SERPL: 0.8 G/DL
ALP SERPL-CCNC: 79 U/L (ref 39–117)
ALT SERPL W P-5'-P-CCNC: 13 U/L (ref 1–33)
ANION GAP SERPL CALCULATED.3IONS-SCNC: 12 MMOL/L (ref 5–15)
AST SERPL-CCNC: 22 U/L (ref 1–32)
BASOPHILS # BLD AUTO: 0.03 10*3/MM3 (ref 0–0.2)
BASOPHILS NFR BLD AUTO: 0.4 % (ref 0–1.5)
BH CV ECHO MEAS - AO MAX PG: 13.1 MMHG
BH CV ECHO MEAS - AO MEAN PG: 7 MMHG
BH CV ECHO MEAS - AO ROOT DIAM: 2.9 CM
BH CV ECHO MEAS - AO V2 MAX: 181 CM/SEC
BH CV ECHO MEAS - AO V2 VTI: 40.8 CM
BH CV ECHO MEAS - AVA(I,D): 1.86 CM2
BH CV ECHO MEAS - EDV(CUBED): 188.1 ML
BH CV ECHO MEAS - EDV(MOD-SP4): 85.5 ML
BH CV ECHO MEAS - EF(MOD-SP4): 70.6 %
BH CV ECHO MEAS - ESV(CUBED): 37.3 ML
BH CV ECHO MEAS - ESV(MOD-SP4): 25.1 ML
BH CV ECHO MEAS - FS: 41.7 %
BH CV ECHO MEAS - IVS/LVPW: 1.16 CM
BH CV ECHO MEAS - IVSD: 1.13 CM
BH CV ECHO MEAS - LA DIMENSION: 4.6 CM
BH CV ECHO MEAS - LAT PEAK E' VEL: 9.6 CM/SEC
BH CV ECHO MEAS - LV DIASTOLIC VOL/BSA (35-75): 41.4 CM2
BH CV ECHO MEAS - LV MASS(C)D: 243.8 GRAMS
BH CV ECHO MEAS - LV MAX PG: 5.7 MMHG
BH CV ECHO MEAS - LV MEAN PG: 3 MMHG
BH CV ECHO MEAS - LV SYSTOLIC VOL/BSA (12-30): 12.1 CM2
BH CV ECHO MEAS - LV V1 MAX: 119 CM/SEC
BH CV ECHO MEAS - LV V1 VTI: 26.7 CM
BH CV ECHO MEAS - LVIDD: 5.7 CM
BH CV ECHO MEAS - LVIDS: 3.3 CM
BH CV ECHO MEAS - LVOT AREA: 2.8 CM2
BH CV ECHO MEAS - LVOT DIAM: 1.9 CM
BH CV ECHO MEAS - LVPWD: 0.97 CM
BH CV ECHO MEAS - MED PEAK E' VEL: 8.3 CM/SEC
BH CV ECHO MEAS - MV A MAX VEL: 66.7 CM/SEC
BH CV ECHO MEAS - MV DEC TIME: 0.19 MSEC
BH CV ECHO MEAS - MV E MAX VEL: 111 CM/SEC
BH CV ECHO MEAS - MV E/A: 1.66
BH CV ECHO MEAS - RAP SYSTOLE: 5 MMHG
BH CV ECHO MEAS - RVSP: 44.4 MMHG
BH CV ECHO MEAS - SI(MOD-SP4): 29.2 ML/M2
BH CV ECHO MEAS - SV(LVOT): 75.7 ML
BH CV ECHO MEAS - SV(MOD-SP4): 60.4 ML
BH CV ECHO MEAS - TR MAX PG: 39.4 MMHG
BH CV ECHO MEAS - TR MAX VEL: 314 CM/SEC
BH CV ECHO MEASUREMENTS AVERAGE E/E' RATIO: 12.4
BILIRUB SERPL-MCNC: 0.4 MG/DL (ref 0–1.2)
BUN SERPL-MCNC: 13 MG/DL (ref 8–23)
BUN/CREAT SERPL: 8.8 (ref 7–25)
CALCIUM SPEC-SCNC: 8.6 MG/DL (ref 8.6–10.5)
CHLORIDE SERPL-SCNC: 95 MMOL/L (ref 98–107)
CHOLEST SERPL-MCNC: 154 MG/DL (ref 0–200)
CO2 SERPL-SCNC: 31 MMOL/L (ref 22–29)
CREAT SERPL-MCNC: 1.48 MG/DL (ref 0.57–1)
DEPRECATED RDW RBC AUTO: 47.7 FL (ref 37–54)
EGFRCR SERPLBLD CKD-EPI 2021: 36.6 ML/MIN/1.73
EOSINOPHIL # BLD AUTO: 0.14 10*3/MM3 (ref 0–0.4)
EOSINOPHIL NFR BLD AUTO: 1.7 % (ref 0.3–6.2)
ERYTHROCYTE [DISTWIDTH] IN BLOOD BY AUTOMATED COUNT: 14.9 % (ref 12.3–15.4)
GLOBULIN UR ELPH-MCNC: 3.6 GM/DL
GLUCOSE BLDC GLUCOMTR-MCNC: 123 MG/DL (ref 70–130)
GLUCOSE BLDC GLUCOMTR-MCNC: 150 MG/DL (ref 70–130)
GLUCOSE BLDC GLUCOMTR-MCNC: 152 MG/DL (ref 70–130)
GLUCOSE BLDC GLUCOMTR-MCNC: 163 MG/DL (ref 70–130)
GLUCOSE SERPL-MCNC: 127 MG/DL (ref 65–99)
HBA1C MFR BLD: 7.2 % (ref 4.8–5.6)
HCT VFR BLD AUTO: 24.1 % (ref 34–46.6)
HDLC SERPL-MCNC: 50 MG/DL (ref 40–60)
HGB BLD-MCNC: 7.3 G/DL (ref 12–15.9)
IMM GRANULOCYTES # BLD AUTO: 0.07 10*3/MM3 (ref 0–0.05)
IMM GRANULOCYTES NFR BLD AUTO: 0.8 % (ref 0–0.5)
LDLC SERPL CALC-MCNC: 84 MG/DL (ref 0–100)
LDLC/HDLC SERPL: 1.63 {RATIO}
LYMPHOCYTES # BLD AUTO: 1.41 10*3/MM3 (ref 0.7–3.1)
LYMPHOCYTES NFR BLD AUTO: 17 % (ref 19.6–45.3)
MAXIMAL PREDICTED HEART RATE: 144 BPM
MCH RBC QN AUTO: 26.8 PG (ref 26.6–33)
MCHC RBC AUTO-ENTMCNC: 30.3 G/DL (ref 31.5–35.7)
MCV RBC AUTO: 88.6 FL (ref 79–97)
MONOCYTES # BLD AUTO: 1.06 10*3/MM3 (ref 0.1–0.9)
MONOCYTES NFR BLD AUTO: 12.8 % (ref 5–12)
NEUTROPHILS NFR BLD AUTO: 5.6 10*3/MM3 (ref 1.7–7)
NEUTROPHILS NFR BLD AUTO: 67.3 % (ref 42.7–76)
NRBC BLD AUTO-RTO: 0 /100 WBC (ref 0–0.2)
PLATELET # BLD AUTO: 150 10*3/MM3 (ref 140–450)
PMV BLD AUTO: 11.2 FL (ref 6–12)
POTASSIUM SERPL-SCNC: 3.7 MMOL/L (ref 3.5–5.2)
PROT SERPL-MCNC: 6.3 G/DL (ref 6–8.5)
QT INTERVAL: 482 MS
QTC INTERVAL: 435 MS
RBC # BLD AUTO: 2.72 10*6/MM3 (ref 3.77–5.28)
SODIUM SERPL-SCNC: 138 MMOL/L (ref 136–145)
STRESS TARGET HR: 122 BPM
TRIGL SERPL-MCNC: 113 MG/DL (ref 0–150)
TSH SERPL DL<=0.05 MIU/L-ACNC: 3.21 UIU/ML (ref 0.27–4.2)
VLDLC SERPL-MCNC: 20 MG/DL (ref 5–40)
WBC NRBC COR # BLD: 8.31 10*3/MM3 (ref 3.4–10.8)

## 2023-01-22 PROCEDURE — 93306 TTE W/DOPPLER COMPLETE: CPT | Performed by: INTERNAL MEDICINE

## 2023-01-22 PROCEDURE — 85025 COMPLETE CBC W/AUTO DIFF WBC: CPT | Performed by: FAMILY MEDICINE

## 2023-01-22 PROCEDURE — 25010000002 CEFTRIAXONE PER 250 MG: Performed by: FAMILY MEDICINE

## 2023-01-22 PROCEDURE — 94664 DEMO&/EVAL PT USE INHALER: CPT

## 2023-01-22 PROCEDURE — 63710000001 INSULIN LISPRO (HUMAN) PER 5 UNITS: Performed by: INTERNAL MEDICINE

## 2023-01-22 PROCEDURE — 92610 EVALUATE SWALLOWING FUNCTION: CPT

## 2023-01-22 PROCEDURE — 84443 ASSAY THYROID STIM HORMONE: CPT | Performed by: FAMILY MEDICINE

## 2023-01-22 PROCEDURE — 93306 TTE W/DOPPLER COMPLETE: CPT

## 2023-01-22 PROCEDURE — 97162 PT EVAL MOD COMPLEX 30 MIN: CPT | Performed by: PHYSICAL THERAPIST

## 2023-01-22 PROCEDURE — 94761 N-INVAS EAR/PLS OXIMETRY MLT: CPT

## 2023-01-22 PROCEDURE — 97166 OT EVAL MOD COMPLEX 45 MIN: CPT | Performed by: OCCUPATIONAL THERAPIST

## 2023-01-22 PROCEDURE — 83036 HEMOGLOBIN GLYCOSYLATED A1C: CPT | Performed by: FAMILY MEDICINE

## 2023-01-22 PROCEDURE — 80061 LIPID PANEL: CPT | Performed by: FAMILY MEDICINE

## 2023-01-22 PROCEDURE — 94799 UNLISTED PULMONARY SVC/PX: CPT

## 2023-01-22 PROCEDURE — 80053 COMPREHEN METABOLIC PANEL: CPT | Performed by: FAMILY MEDICINE

## 2023-01-22 PROCEDURE — 82962 GLUCOSE BLOOD TEST: CPT

## 2023-01-22 RX ORDER — INSULIN LISPRO 100 [IU]/ML
2-7 INJECTION, SOLUTION INTRAVENOUS; SUBCUTANEOUS
Status: DISCONTINUED | OUTPATIENT
Start: 2023-01-22 | End: 2023-01-26 | Stop reason: HOSPADM

## 2023-01-22 RX ORDER — NICOTINE POLACRILEX 4 MG
15 LOZENGE BUCCAL
Status: DISCONTINUED | OUTPATIENT
Start: 2023-01-22 | End: 2023-01-26 | Stop reason: HOSPADM

## 2023-01-22 RX ORDER — DEXTROSE MONOHYDRATE 25 G/50ML
25 INJECTION, SOLUTION INTRAVENOUS
Status: DISCONTINUED | OUTPATIENT
Start: 2023-01-22 | End: 2023-01-26 | Stop reason: HOSPADM

## 2023-01-22 RX ORDER — SCOLOPAMINE TRANSDERMAL SYSTEM 1 MG/1
1 PATCH, EXTENDED RELEASE TRANSDERMAL
Status: DISCONTINUED | OUTPATIENT
Start: 2023-01-22 | End: 2023-01-25

## 2023-01-22 RX ADMIN — IPRATROPIUM BROMIDE AND ALBUTEROL SULFATE 3 ML: 2.5; .5 SOLUTION RESPIRATORY (INHALATION) at 06:58

## 2023-01-22 RX ADMIN — INSULIN LISPRO 2 UNITS: 100 INJECTION, SOLUTION INTRAVENOUS; SUBCUTANEOUS at 17:50

## 2023-01-22 RX ADMIN — CALCITRIOL 0.25 MCG: 0.25 CAPSULE ORAL at 09:27

## 2023-01-22 RX ADMIN — CEFTRIAXONE 1 G: 1 INJECTION, POWDER, FOR SOLUTION INTRAMUSCULAR; INTRAVENOUS at 17:49

## 2023-01-22 RX ADMIN — LINAGLIPTIN 5 MG: 5 TABLET, FILM COATED ORAL at 09:30

## 2023-01-22 RX ADMIN — IPRATROPIUM BROMIDE AND ALBUTEROL SULFATE 3 ML: 2.5; .5 SOLUTION RESPIRATORY (INHALATION) at 15:34

## 2023-01-22 RX ADMIN — Medication 10 ML: at 09:32

## 2023-01-22 RX ADMIN — Medication 10 ML: at 20:25

## 2023-01-22 RX ADMIN — SODIUM CHLORIDE 1000 ML: 9 INJECTION, SOLUTION INTRAVENOUS at 02:33

## 2023-01-22 RX ADMIN — SCOPALAMINE 1 PATCH: 1 PATCH, EXTENDED RELEASE TRANSDERMAL at 22:08

## 2023-01-22 RX ADMIN — SODIUM CHLORIDE, POTASSIUM CHLORIDE, SODIUM LACTATE AND CALCIUM CHLORIDE 100 ML/HR: 600; 310; 30; 20 INJECTION, SOLUTION INTRAVENOUS at 02:39

## 2023-01-22 RX ADMIN — PANTOPRAZOLE SODIUM 40 MG: 40 TABLET, DELAYED RELEASE ORAL at 05:00

## 2023-01-22 RX ADMIN — IPRATROPIUM BROMIDE AND ALBUTEROL SULFATE 3 ML: 2.5; .5 SOLUTION RESPIRATORY (INHALATION) at 19:17

## 2023-01-22 RX ADMIN — DOCUSATE SODIUM 50 MG AND SENNOSIDES 8.6 MG 2 TABLET: 8.6; 5 TABLET, FILM COATED ORAL at 09:31

## 2023-01-22 RX ADMIN — INSULIN LISPRO 2 UNITS: 100 INJECTION, SOLUTION INTRAVENOUS; SUBCUTANEOUS at 12:16

## 2023-01-22 RX ADMIN — ASPIRIN 81 MG: 81 TABLET, CHEWABLE ORAL at 09:29

## 2023-01-22 RX ADMIN — Medication 500 UNITS: at 09:25

## 2023-01-23 ENCOUNTER — APPOINTMENT (OUTPATIENT)
Dept: CT IMAGING | Facility: HOSPITAL | Age: 78
End: 2023-01-23
Payer: MEDICARE

## 2023-01-23 PROBLEM — N18.31 STAGE 3A CHRONIC KIDNEY DISEASE (HCC): Status: ACTIVE | Noted: 2019-08-18

## 2023-01-23 PROBLEM — E66.01 OBESITY, CLASS III, BMI 40-49.9 (MORBID OBESITY): Status: ACTIVE | Noted: 2023-01-23

## 2023-01-23 PROBLEM — N30.01 ACUTE CYSTITIS WITH HEMATURIA: Status: ACTIVE | Noted: 2023-01-21

## 2023-01-23 PROBLEM — R41.82 ALTERED MENTAL STATUS, UNSPECIFIED ALTERED MENTAL STATUS TYPE: Status: ACTIVE | Noted: 2023-01-23

## 2023-01-23 PROBLEM — G93.41 METABOLIC ENCEPHALOPATHY: Status: ACTIVE | Noted: 2023-01-21

## 2023-01-23 LAB
ANION GAP SERPL CALCULATED.3IONS-SCNC: 10 MMOL/L (ref 5–15)
ARTERIAL PATENCY WRIST A: POSITIVE
ATMOSPHERIC PRESS: 756 MMHG
BACTERIA SPEC AEROBE CULT: ABNORMAL
BASE EXCESS BLDA CALC-SCNC: 10.2 MMOL/L (ref 0–2)
BDY SITE: ABNORMAL
BODY TEMPERATURE: 37 C
BUN SERPL-MCNC: 10 MG/DL (ref 8–23)
BUN/CREAT SERPL: 7.2 (ref 7–25)
CALCIUM SPEC-SCNC: 8.8 MG/DL (ref 8.6–10.5)
CHLORIDE SERPL-SCNC: 99 MMOL/L (ref 98–107)
CO2 SERPL-SCNC: 33 MMOL/L (ref 22–29)
CREAT SERPL-MCNC: 1.39 MG/DL (ref 0.57–1)
DEPRECATED RDW RBC AUTO: 49.4 FL (ref 37–54)
EGFRCR SERPLBLD CKD-EPI 2021: 39.4 ML/MIN/1.73
ERYTHROCYTE [DISTWIDTH] IN BLOOD BY AUTOMATED COUNT: 15.3 % (ref 12.3–15.4)
GAS FLOW AIRWAY: 1.5 LPM
GLUCOSE BLDC GLUCOMTR-MCNC: 102 MG/DL (ref 70–130)
GLUCOSE BLDC GLUCOMTR-MCNC: 141 MG/DL (ref 70–130)
GLUCOSE BLDC GLUCOMTR-MCNC: 185 MG/DL (ref 70–130)
GLUCOSE SERPL-MCNC: 107 MG/DL (ref 65–99)
HCO3 BLDA-SCNC: 35.5 MMOL/L (ref 20–26)
HCT VFR BLD AUTO: 26.5 % (ref 34–46.6)
HGB BLD-MCNC: 7.6 G/DL (ref 12–15.9)
Lab: ABNORMAL
MCH RBC QN AUTO: 26.2 PG (ref 26.6–33)
MCHC RBC AUTO-ENTMCNC: 28.7 G/DL (ref 31.5–35.7)
MCV RBC AUTO: 91.4 FL (ref 79–97)
MODALITY: ABNORMAL
PCO2 BLDA: 53.1 MM HG (ref 35–45)
PCO2 TEMP ADJ BLD: 53.1 MM HG (ref 35–45)
PH BLDA: 7.43 PH UNITS (ref 7.35–7.45)
PH, TEMP CORRECTED: 7.43 PH UNITS (ref 7.35–7.45)
PLATELET # BLD AUTO: 150 10*3/MM3 (ref 140–450)
PMV BLD AUTO: 11.2 FL (ref 6–12)
PO2 BLDA: 68.6 MM HG (ref 83–108)
PO2 TEMP ADJ BLD: 68.6 MM HG (ref 83–108)
POTASSIUM SERPL-SCNC: 3.7 MMOL/L (ref 3.5–5.2)
RBC # BLD AUTO: 2.9 10*6/MM3 (ref 3.77–5.28)
SAO2 % BLDCOA: 94.6 % (ref 94–99)
SODIUM SERPL-SCNC: 142 MMOL/L (ref 136–145)
VENTILATOR MODE: ABNORMAL
WBC NRBC COR # BLD: 8.8 10*3/MM3 (ref 3.4–10.8)

## 2023-01-23 PROCEDURE — 82962 GLUCOSE BLOOD TEST: CPT

## 2023-01-23 PROCEDURE — 97530 THERAPEUTIC ACTIVITIES: CPT

## 2023-01-23 PROCEDURE — 36600 WITHDRAWAL OF ARTERIAL BLOOD: CPT

## 2023-01-23 PROCEDURE — 97110 THERAPEUTIC EXERCISES: CPT

## 2023-01-23 PROCEDURE — 74176 CT ABD & PELVIS W/O CONTRAST: CPT

## 2023-01-23 PROCEDURE — 80048 BASIC METABOLIC PNL TOTAL CA: CPT | Performed by: FAMILY MEDICINE

## 2023-01-23 PROCEDURE — 85027 COMPLETE CBC AUTOMATED: CPT | Performed by: FAMILY MEDICINE

## 2023-01-23 PROCEDURE — 82803 BLOOD GASES ANY COMBINATION: CPT

## 2023-01-23 PROCEDURE — 94799 UNLISTED PULMONARY SVC/PX: CPT

## 2023-01-23 PROCEDURE — 25010000002 CEFTRIAXONE PER 250 MG: Performed by: FAMILY MEDICINE

## 2023-01-23 PROCEDURE — 94664 DEMO&/EVAL PT USE INHALER: CPT

## 2023-01-23 PROCEDURE — 92526 ORAL FUNCTION THERAPY: CPT | Performed by: SPEECH-LANGUAGE PATHOLOGIST

## 2023-01-23 PROCEDURE — 63710000001 INSULIN LISPRO (HUMAN) PER 5 UNITS: Performed by: INTERNAL MEDICINE

## 2023-01-23 PROCEDURE — 97535 SELF CARE MNGMENT TRAINING: CPT

## 2023-01-23 RX ORDER — METOPROLOL TARTRATE 50 MG/1
50 TABLET, FILM COATED ORAL 2 TIMES DAILY
Status: DISCONTINUED | OUTPATIENT
Start: 2023-01-23 | End: 2023-01-23

## 2023-01-23 RX ADMIN — DOCUSATE SODIUM 50 MG AND SENNOSIDES 8.6 MG 2 TABLET: 8.6; 5 TABLET, FILM COATED ORAL at 09:37

## 2023-01-23 RX ADMIN — Medication 10 ML: at 20:36

## 2023-01-23 RX ADMIN — Medication 500 UNITS: at 09:37

## 2023-01-23 RX ADMIN — Medication 10 ML: at 09:37

## 2023-01-23 RX ADMIN — IPRATROPIUM BROMIDE AND ALBUTEROL SULFATE 3 ML: 2.5; .5 SOLUTION RESPIRATORY (INHALATION) at 13:39

## 2023-01-23 RX ADMIN — LINAGLIPTIN 5 MG: 5 TABLET, FILM COATED ORAL at 09:37

## 2023-01-23 RX ADMIN — ATORVASTATIN CALCIUM 20 MG: 10 TABLET, FILM COATED ORAL at 20:36

## 2023-01-23 RX ADMIN — IPRATROPIUM BROMIDE AND ALBUTEROL SULFATE 3 ML: 2.5; .5 SOLUTION RESPIRATORY (INHALATION) at 07:37

## 2023-01-23 RX ADMIN — CEFTRIAXONE 1 G: 1 INJECTION, POWDER, FOR SOLUTION INTRAMUSCULAR; INTRAVENOUS at 17:22

## 2023-01-23 RX ADMIN — DOCUSATE SODIUM 50 MG AND SENNOSIDES 8.6 MG 2 TABLET: 8.6; 5 TABLET, FILM COATED ORAL at 20:36

## 2023-01-23 RX ADMIN — ASPIRIN 81 MG: 81 TABLET, CHEWABLE ORAL at 09:37

## 2023-01-23 RX ADMIN — INSULIN LISPRO 2 UNITS: 100 INJECTION, SOLUTION INTRAVENOUS; SUBCUTANEOUS at 11:56

## 2023-01-23 RX ADMIN — CALCITRIOL 0.25 MCG: 0.25 CAPSULE ORAL at 09:37

## 2023-01-23 RX ADMIN — SERTRALINE HYDROCHLORIDE 50 MG: 50 TABLET, FILM COATED ORAL at 15:38

## 2023-01-23 RX ADMIN — IPRATROPIUM BROMIDE AND ALBUTEROL SULFATE 3 ML: 2.5; .5 SOLUTION RESPIRATORY (INHALATION) at 19:27

## 2023-01-24 PROBLEM — Z87.19 H/O VENTRAL HERNIA: Status: ACTIVE | Noted: 2023-01-24

## 2023-01-24 PROBLEM — R41.82 ALTERED MENTAL STATUS, UNSPECIFIED ALTERED MENTAL STATUS TYPE: Status: ACTIVE | Noted: 2023-01-24

## 2023-01-24 LAB
AMMONIA BLD-SCNC: 16 UMOL/L (ref 11–51)
ANION GAP SERPL CALCULATED.3IONS-SCNC: 7 MMOL/L (ref 5–15)
BUN SERPL-MCNC: 8 MG/DL (ref 8–23)
BUN/CREAT SERPL: 6.6 (ref 7–25)
CALCIUM SPEC-SCNC: 8.6 MG/DL (ref 8.6–10.5)
CHLORIDE SERPL-SCNC: 103 MMOL/L (ref 98–107)
CO2 SERPL-SCNC: 33 MMOL/L (ref 22–29)
CREAT SERPL-MCNC: 1.22 MG/DL (ref 0.57–1)
DEPRECATED RDW RBC AUTO: 50.2 FL (ref 37–54)
EGFRCR SERPLBLD CKD-EPI 2021: 46.1 ML/MIN/1.73
ERYTHROCYTE [DISTWIDTH] IN BLOOD BY AUTOMATED COUNT: 15.4 % (ref 12.3–15.4)
GLUCOSE BLDC GLUCOMTR-MCNC: 127 MG/DL (ref 70–130)
GLUCOSE BLDC GLUCOMTR-MCNC: 132 MG/DL (ref 70–130)
GLUCOSE BLDC GLUCOMTR-MCNC: 168 MG/DL (ref 70–130)
GLUCOSE BLDC GLUCOMTR-MCNC: 174 MG/DL (ref 70–130)
GLUCOSE SERPL-MCNC: 114 MG/DL (ref 65–99)
HCT VFR BLD AUTO: 24.8 % (ref 34–46.6)
HGB BLD-MCNC: 7.2 G/DL (ref 12–15.9)
MCH RBC QN AUTO: 26.8 PG (ref 26.6–33)
MCHC RBC AUTO-ENTMCNC: 29 G/DL (ref 31.5–35.7)
MCV RBC AUTO: 92.2 FL (ref 79–97)
PLATELET # BLD AUTO: 150 10*3/MM3 (ref 140–450)
PMV BLD AUTO: 11.2 FL (ref 6–12)
POTASSIUM SERPL-SCNC: 3.8 MMOL/L (ref 3.5–5.2)
RBC # BLD AUTO: 2.69 10*6/MM3 (ref 3.77–5.28)
SODIUM SERPL-SCNC: 143 MMOL/L (ref 136–145)
WBC NRBC COR # BLD: 5.65 10*3/MM3 (ref 3.4–10.8)

## 2023-01-24 PROCEDURE — 82962 GLUCOSE BLOOD TEST: CPT

## 2023-01-24 PROCEDURE — 97110 THERAPEUTIC EXERCISES: CPT

## 2023-01-24 PROCEDURE — 97116 GAIT TRAINING THERAPY: CPT

## 2023-01-24 PROCEDURE — G0378 HOSPITAL OBSERVATION PER HR: HCPCS

## 2023-01-24 PROCEDURE — 25010000002 CEFTRIAXONE PER 250 MG: Performed by: FAMILY MEDICINE

## 2023-01-24 PROCEDURE — 85027 COMPLETE CBC AUTOMATED: CPT | Performed by: INTERNAL MEDICINE

## 2023-01-24 PROCEDURE — 94799 UNLISTED PULMONARY SVC/PX: CPT

## 2023-01-24 PROCEDURE — 63710000001 INSULIN LISPRO (HUMAN) PER 5 UNITS: Performed by: INTERNAL MEDICINE

## 2023-01-24 PROCEDURE — 97535 SELF CARE MNGMENT TRAINING: CPT

## 2023-01-24 PROCEDURE — 92526 ORAL FUNCTION THERAPY: CPT | Performed by: SPEECH-LANGUAGE PATHOLOGIST

## 2023-01-24 PROCEDURE — 80048 BASIC METABOLIC PNL TOTAL CA: CPT | Performed by: INTERNAL MEDICINE

## 2023-01-24 PROCEDURE — 82140 ASSAY OF AMMONIA: CPT | Performed by: INTERNAL MEDICINE

## 2023-01-24 RX ADMIN — Medication 500 UNITS: at 09:31

## 2023-01-24 RX ADMIN — ATORVASTATIN CALCIUM 20 MG: 10 TABLET, FILM COATED ORAL at 20:10

## 2023-01-24 RX ADMIN — CEFTRIAXONE 1 G: 1 INJECTION, POWDER, FOR SOLUTION INTRAMUSCULAR; INTRAVENOUS at 17:14

## 2023-01-24 RX ADMIN — LINAGLIPTIN 5 MG: 5 TABLET, FILM COATED ORAL at 09:31

## 2023-01-24 RX ADMIN — PANTOPRAZOLE SODIUM 40 MG: 40 TABLET, DELAYED RELEASE ORAL at 06:12

## 2023-01-24 RX ADMIN — ASPIRIN 81 MG: 81 TABLET, CHEWABLE ORAL at 09:30

## 2023-01-24 RX ADMIN — Medication 10 ML: at 20:10

## 2023-01-24 RX ADMIN — IPRATROPIUM BROMIDE AND ALBUTEROL SULFATE 3 ML: 2.5; .5 SOLUTION RESPIRATORY (INHALATION) at 19:31

## 2023-01-24 RX ADMIN — INSULIN LISPRO 2 UNITS: 100 INJECTION, SOLUTION INTRAVENOUS; SUBCUTANEOUS at 17:14

## 2023-01-24 RX ADMIN — IPRATROPIUM BROMIDE AND ALBUTEROL SULFATE 3 ML: 2.5; .5 SOLUTION RESPIRATORY (INHALATION) at 07:07

## 2023-01-24 RX ADMIN — SERTRALINE HYDROCHLORIDE 50 MG: 50 TABLET, FILM COATED ORAL at 09:30

## 2023-01-24 RX ADMIN — CALCITRIOL 0.25 MCG: 0.25 CAPSULE ORAL at 09:30

## 2023-01-24 RX ADMIN — Medication 10 ML: at 09:31

## 2023-01-24 RX ADMIN — DOCUSATE SODIUM 50 MG AND SENNOSIDES 8.6 MG 2 TABLET: 8.6; 5 TABLET, FILM COATED ORAL at 20:10

## 2023-01-24 RX ADMIN — INSULIN LISPRO 2 UNITS: 100 INJECTION, SOLUTION INTRAVENOUS; SUBCUTANEOUS at 12:11

## 2023-01-24 RX ADMIN — IPRATROPIUM BROMIDE AND ALBUTEROL SULFATE 3 ML: 2.5; .5 SOLUTION RESPIRATORY (INHALATION) at 14:53

## 2023-01-24 RX ADMIN — DOCUSATE SODIUM 50 MG AND SENNOSIDES 8.6 MG 2 TABLET: 8.6; 5 TABLET, FILM COATED ORAL at 09:30

## 2023-01-25 PROBLEM — I48.0 PAF (PAROXYSMAL ATRIAL FIBRILLATION) (HCC): Status: ACTIVE | Noted: 2023-01-25

## 2023-01-25 LAB
GLUCOSE BLDC GLUCOMTR-MCNC: 137 MG/DL (ref 70–130)
GLUCOSE BLDC GLUCOMTR-MCNC: 140 MG/DL (ref 70–130)
GLUCOSE BLDC GLUCOMTR-MCNC: 155 MG/DL (ref 70–130)

## 2023-01-25 PROCEDURE — 97110 THERAPEUTIC EXERCISES: CPT

## 2023-01-25 PROCEDURE — 92526 ORAL FUNCTION THERAPY: CPT | Performed by: SPEECH-LANGUAGE PATHOLOGIST

## 2023-01-25 PROCEDURE — 82962 GLUCOSE BLOOD TEST: CPT

## 2023-01-25 PROCEDURE — 97116 GAIT TRAINING THERAPY: CPT

## 2023-01-25 PROCEDURE — 94799 UNLISTED PULMONARY SVC/PX: CPT

## 2023-01-25 PROCEDURE — G0378 HOSPITAL OBSERVATION PER HR: HCPCS

## 2023-01-25 PROCEDURE — 25010000002 CEFTRIAXONE PER 250 MG: Performed by: FAMILY MEDICINE

## 2023-01-25 PROCEDURE — 97530 THERAPEUTIC ACTIVITIES: CPT

## 2023-01-25 PROCEDURE — 63710000001 INSULIN LISPRO (HUMAN) PER 5 UNITS: Performed by: INTERNAL MEDICINE

## 2023-01-25 RX ORDER — METOPROLOL TARTRATE 50 MG/1
50 TABLET, FILM COATED ORAL 2 TIMES DAILY
Status: DISCONTINUED | OUTPATIENT
Start: 2023-01-25 | End: 2023-01-26 | Stop reason: HOSPADM

## 2023-01-25 RX ADMIN — METOPROLOL TARTRATE 50 MG: 50 TABLET, FILM COATED ORAL at 09:40

## 2023-01-25 RX ADMIN — ASPIRIN 81 MG: 81 TABLET, CHEWABLE ORAL at 08:00

## 2023-01-25 RX ADMIN — DOCUSATE SODIUM 50 MG AND SENNOSIDES 8.6 MG 2 TABLET: 8.6; 5 TABLET, FILM COATED ORAL at 08:00

## 2023-01-25 RX ADMIN — ATORVASTATIN CALCIUM 20 MG: 10 TABLET, FILM COATED ORAL at 21:37

## 2023-01-25 RX ADMIN — SERTRALINE HYDROCHLORIDE 50 MG: 50 TABLET, FILM COATED ORAL at 08:00

## 2023-01-25 RX ADMIN — IPRATROPIUM BROMIDE AND ALBUTEROL SULFATE 3 ML: 2.5; .5 SOLUTION RESPIRATORY (INHALATION) at 14:45

## 2023-01-25 RX ADMIN — CEFTRIAXONE 1 G: 1 INJECTION, POWDER, FOR SOLUTION INTRAMUSCULAR; INTRAVENOUS at 17:11

## 2023-01-25 RX ADMIN — PANTOPRAZOLE SODIUM 40 MG: 40 TABLET, DELAYED RELEASE ORAL at 06:16

## 2023-01-25 RX ADMIN — CALCITRIOL 0.25 MCG: 0.25 CAPSULE ORAL at 08:00

## 2023-01-25 RX ADMIN — Medication 10 ML: at 21:38

## 2023-01-25 RX ADMIN — LINAGLIPTIN 5 MG: 5 TABLET, FILM COATED ORAL at 08:00

## 2023-01-25 RX ADMIN — IPRATROPIUM BROMIDE AND ALBUTEROL SULFATE 3 ML: 2.5; .5 SOLUTION RESPIRATORY (INHALATION) at 19:49

## 2023-01-25 RX ADMIN — Medication 10 ML: at 07:59

## 2023-01-25 RX ADMIN — INSULIN LISPRO 2 UNITS: 100 INJECTION, SOLUTION INTRAVENOUS; SUBCUTANEOUS at 11:21

## 2023-01-25 RX ADMIN — DOCUSATE SODIUM 50 MG AND SENNOSIDES 8.6 MG 2 TABLET: 8.6; 5 TABLET, FILM COATED ORAL at 21:37

## 2023-01-25 RX ADMIN — METOPROLOL TARTRATE 50 MG: 50 TABLET, FILM COATED ORAL at 21:37

## 2023-01-25 RX ADMIN — IPRATROPIUM BROMIDE AND ALBUTEROL SULFATE 3 ML: 2.5; .5 SOLUTION RESPIRATORY (INHALATION) at 06:59

## 2023-01-25 RX ADMIN — Medication 500 UNITS: at 08:33

## 2023-01-26 VITALS
BODY MASS INDEX: 41.82 KG/M2 | HEIGHT: 63 IN | DIASTOLIC BLOOD PRESSURE: 49 MMHG | TEMPERATURE: 98.6 F | SYSTOLIC BLOOD PRESSURE: 114 MMHG | RESPIRATION RATE: 20 BRPM | WEIGHT: 236 LBS | OXYGEN SATURATION: 92 % | HEART RATE: 69 BPM

## 2023-01-26 LAB
ANION GAP SERPL CALCULATED.3IONS-SCNC: 9 MMOL/L (ref 5–15)
BACTERIA SPEC AEROBE CULT: NORMAL
BACTERIA SPEC AEROBE CULT: NORMAL
BUN SERPL-MCNC: 9 MG/DL (ref 8–23)
BUN/CREAT SERPL: 7.4 (ref 7–25)
CALCIUM SPEC-SCNC: 8.7 MG/DL (ref 8.6–10.5)
CHLORIDE SERPL-SCNC: 104 MMOL/L (ref 98–107)
CO2 SERPL-SCNC: 29 MMOL/L (ref 22–29)
CREAT SERPL-MCNC: 1.21 MG/DL (ref 0.57–1)
DEPRECATED RDW RBC AUTO: 51.5 FL (ref 37–54)
EGFRCR SERPLBLD CKD-EPI 2021: 46.5 ML/MIN/1.73
ERYTHROCYTE [DISTWIDTH] IN BLOOD BY AUTOMATED COUNT: 15.7 % (ref 12.3–15.4)
GLUCOSE BLDC GLUCOMTR-MCNC: 115 MG/DL (ref 70–130)
GLUCOSE BLDC GLUCOMTR-MCNC: 127 MG/DL (ref 70–130)
GLUCOSE BLDC GLUCOMTR-MCNC: 188 MG/DL (ref 70–130)
GLUCOSE SERPL-MCNC: 125 MG/DL (ref 65–99)
HCT VFR BLD AUTO: 25.6 % (ref 34–46.6)
HGB BLD-MCNC: 7.6 G/DL (ref 12–15.9)
MAGNESIUM SERPL-MCNC: 1.9 MG/DL (ref 1.6–2.4)
MCH RBC QN AUTO: 27 PG (ref 26.6–33)
MCHC RBC AUTO-ENTMCNC: 29.7 G/DL (ref 31.5–35.7)
MCV RBC AUTO: 91.1 FL (ref 79–97)
PLATELET # BLD AUTO: 188 10*3/MM3 (ref 140–450)
PMV BLD AUTO: 11 FL (ref 6–12)
POTASSIUM SERPL-SCNC: 4.1 MMOL/L (ref 3.5–5.2)
RBC # BLD AUTO: 2.81 10*6/MM3 (ref 3.77–5.28)
SODIUM SERPL-SCNC: 142 MMOL/L (ref 136–145)
WBC NRBC COR # BLD: 4.98 10*3/MM3 (ref 3.4–10.8)

## 2023-01-26 PROCEDURE — G0378 HOSPITAL OBSERVATION PER HR: HCPCS

## 2023-01-26 PROCEDURE — 94799 UNLISTED PULMONARY SVC/PX: CPT

## 2023-01-26 PROCEDURE — 85027 COMPLETE CBC AUTOMATED: CPT | Performed by: INTERNAL MEDICINE

## 2023-01-26 PROCEDURE — 94664 DEMO&/EVAL PT USE INHALER: CPT

## 2023-01-26 PROCEDURE — 82962 GLUCOSE BLOOD TEST: CPT

## 2023-01-26 PROCEDURE — 94761 N-INVAS EAR/PLS OXIMETRY MLT: CPT

## 2023-01-26 PROCEDURE — 97535 SELF CARE MNGMENT TRAINING: CPT

## 2023-01-26 PROCEDURE — 92526 ORAL FUNCTION THERAPY: CPT | Performed by: SPEECH-LANGUAGE PATHOLOGIST

## 2023-01-26 PROCEDURE — 97530 THERAPEUTIC ACTIVITIES: CPT

## 2023-01-26 PROCEDURE — 83735 ASSAY OF MAGNESIUM: CPT | Performed by: INTERNAL MEDICINE

## 2023-01-26 PROCEDURE — 97110 THERAPEUTIC EXERCISES: CPT

## 2023-01-26 PROCEDURE — 63710000001 INSULIN LISPRO (HUMAN) PER 5 UNITS: Performed by: INTERNAL MEDICINE

## 2023-01-26 PROCEDURE — 80048 BASIC METABOLIC PNL TOTAL CA: CPT | Performed by: INTERNAL MEDICINE

## 2023-01-26 RX ORDER — ASPIRIN 81 MG/1
81 TABLET, CHEWABLE ORAL DAILY
Start: 2023-01-27

## 2023-01-26 RX ORDER — POLYETHYLENE GLYCOL 3350 17 G/17G
17 POWDER, FOR SOLUTION ORAL DAILY PRN
Start: 2023-01-26

## 2023-01-26 RX ORDER — ATORVASTATIN CALCIUM 20 MG/1
20 TABLET, FILM COATED ORAL NIGHTLY
Qty: 90 TABLET
Start: 2023-01-26

## 2023-01-26 RX ORDER — HYDROXYZINE HYDROCHLORIDE 25 MG/1
25 TABLET, FILM COATED ORAL EVERY 6 HOURS PRN
Start: 2023-01-26

## 2023-01-26 RX ADMIN — IPRATROPIUM BROMIDE AND ALBUTEROL SULFATE 3 ML: 2.5; .5 SOLUTION RESPIRATORY (INHALATION) at 07:08

## 2023-01-26 RX ADMIN — IPRATROPIUM BROMIDE AND ALBUTEROL SULFATE 3 ML: 2.5; .5 SOLUTION RESPIRATORY (INHALATION) at 14:27

## 2023-01-26 RX ADMIN — DOCUSATE SODIUM 50 MG AND SENNOSIDES 8.6 MG 2 TABLET: 8.6; 5 TABLET, FILM COATED ORAL at 09:28

## 2023-01-26 RX ADMIN — SERTRALINE HYDROCHLORIDE 50 MG: 50 TABLET, FILM COATED ORAL at 09:28

## 2023-01-26 RX ADMIN — ASPIRIN 81 MG: 81 TABLET, CHEWABLE ORAL at 09:28

## 2023-01-26 RX ADMIN — INSULIN LISPRO 2 UNITS: 100 INJECTION, SOLUTION INTRAVENOUS; SUBCUTANEOUS at 12:35

## 2023-01-26 RX ADMIN — LINAGLIPTIN 5 MG: 5 TABLET, FILM COATED ORAL at 09:28

## 2023-01-26 RX ADMIN — METOPROLOL TARTRATE 50 MG: 50 TABLET, FILM COATED ORAL at 09:28

## 2023-01-26 RX ADMIN — CALCITRIOL 0.25 MCG: 0.25 CAPSULE ORAL at 09:28

## 2023-01-26 RX ADMIN — PANTOPRAZOLE SODIUM 40 MG: 40 TABLET, DELAYED RELEASE ORAL at 06:11

## 2023-01-26 RX ADMIN — Medication 500 UNITS: at 09:30
